# Patient Record
Sex: FEMALE | Race: BLACK OR AFRICAN AMERICAN | NOT HISPANIC OR LATINO | URBAN - METROPOLITAN AREA
[De-identification: names, ages, dates, MRNs, and addresses within clinical notes are randomized per-mention and may not be internally consistent; named-entity substitution may affect disease eponyms.]

---

## 2020-11-06 ENCOUNTER — LAB VISIT (OUTPATIENT)
Dept: LAB | Facility: OTHER | Age: 70
End: 2020-11-06
Payer: MEDICARE

## 2020-11-06 DIAGNOSIS — Z03.818 ENCOUNTER FOR OBSERVATION FOR SUSPECTED EXPOSURE TO OTHER BIOLOGICAL AGENTS RULED OUT: ICD-10-CM

## 2020-11-06 PROCEDURE — U0003 INFECTIOUS AGENT DETECTION BY NUCLEIC ACID (DNA OR RNA); SEVERE ACUTE RESPIRATORY SYNDROME CORONAVIRUS 2 (SARS-COV-2) (CORONAVIRUS DISEASE [COVID-19]), AMPLIFIED PROBE TECHNIQUE, MAKING USE OF HIGH THROUGHPUT TECHNOLOGIES AS DESCRIBED BY CMS-2020-01-R: HCPCS

## 2020-11-07 LAB — SARS-COV-2 RNA RESP QL NAA+PROBE: NOT DETECTED

## 2021-04-16 ENCOUNTER — PATIENT MESSAGE (OUTPATIENT)
Dept: RESEARCH | Facility: HOSPITAL | Age: 71
End: 2021-04-16

## 2021-05-25 ENCOUNTER — HOSPITAL ENCOUNTER (OUTPATIENT)
Dept: RADIOLOGY | Facility: OTHER | Age: 71
Discharge: HOME | End: 2021-05-25
Attending: ORTHOPAEDIC SURGERY
Payer: MEDICARE

## 2021-05-25 DIAGNOSIS — M51.36 OTHER INTERVERTEBRAL DISC DEGENERATION, LUMBAR REGION: ICD-10-CM

## 2021-05-25 DIAGNOSIS — M48.062 SPINAL STENOSIS, LUMBAR REGION WITH NEUROGENIC CLAUDICATION: ICD-10-CM

## 2021-05-25 DIAGNOSIS — M53.2X7 SPINAL INSTABILITIES OF LUMBOSACRAL REGION: ICD-10-CM

## 2021-05-25 DIAGNOSIS — M43.16 SPONDYLOLISTHESIS, LUMBAR REGION: ICD-10-CM

## 2021-05-25 PROCEDURE — 72148 MRI LUMBAR SPINE WITHOUT CONTRAST: ICD-10-PCS | Mod: 26,,, | Performed by: RADIOLOGY

## 2021-05-25 PROCEDURE — 72148 MRI LUMBAR SPINE W/O DYE: CPT | Mod: TC

## 2021-05-25 PROCEDURE — 72148 MRI LUMBAR SPINE W/O DYE: CPT | Mod: 26,,, | Performed by: RADIOLOGY

## 2023-05-22 ENCOUNTER — TELEPHONE (OUTPATIENT)
Dept: PAIN MEDICINE | Facility: CLINIC | Age: 73
End: 2023-05-22
Payer: MEDICARE

## 2023-05-23 ENCOUNTER — OFFICE VISIT (OUTPATIENT)
Dept: PAIN MEDICINE | Facility: CLINIC | Age: 73
End: 2023-05-23
Payer: MEDICARE

## 2023-05-23 VITALS
SYSTOLIC BLOOD PRESSURE: 119 MMHG | HEIGHT: 68 IN | BODY MASS INDEX: 29.73 KG/M2 | WEIGHT: 196.19 LBS | TEMPERATURE: 99 F | DIASTOLIC BLOOD PRESSURE: 67 MMHG | RESPIRATION RATE: 19 BRPM | HEART RATE: 64 BPM

## 2023-05-23 DIAGNOSIS — M51.36 DDD (DEGENERATIVE DISC DISEASE), LUMBAR: Primary | ICD-10-CM

## 2023-05-23 DIAGNOSIS — M43.06 SPONDYLOLYSIS OF LUMBAR REGION: ICD-10-CM

## 2023-05-23 DIAGNOSIS — G89.4 CHRONIC PAIN DISORDER: ICD-10-CM

## 2023-05-23 PROCEDURE — 3078F DIAST BP <80 MM HG: CPT | Mod: CPTII,S$GLB,, | Performed by: ANESTHESIOLOGY

## 2023-05-23 PROCEDURE — 3078F PR MOST RECENT DIASTOLIC BLOOD PRESSURE < 80 MM HG: ICD-10-PCS | Mod: CPTII,S$GLB,, | Performed by: ANESTHESIOLOGY

## 2023-05-23 PROCEDURE — 1125F PR PAIN SEVERITY QUANTIFIED, PAIN PRESENT: ICD-10-PCS | Mod: CPTII,S$GLB,, | Performed by: ANESTHESIOLOGY

## 2023-05-23 PROCEDURE — 1160F PR REVIEW ALL MEDS BY PRESCRIBER/CLIN PHARMACIST DOCUMENTED: ICD-10-PCS | Mod: CPTII,S$GLB,, | Performed by: ANESTHESIOLOGY

## 2023-05-23 PROCEDURE — 1160F RVW MEDS BY RX/DR IN RCRD: CPT | Mod: CPTII,S$GLB,, | Performed by: ANESTHESIOLOGY

## 2023-05-23 PROCEDURE — 3074F SYST BP LT 130 MM HG: CPT | Mod: CPTII,S$GLB,, | Performed by: ANESTHESIOLOGY

## 2023-05-23 PROCEDURE — 99204 PR OFFICE/OUTPT VISIT, NEW, LEVL IV, 45-59 MIN: ICD-10-PCS | Mod: GC,S$GLB,, | Performed by: ANESTHESIOLOGY

## 2023-05-23 PROCEDURE — 99204 OFFICE O/P NEW MOD 45 MIN: CPT | Mod: GC,S$GLB,, | Performed by: ANESTHESIOLOGY

## 2023-05-23 PROCEDURE — 3288F PR FALLS RISK ASSESSMENT DOCUMENTED: ICD-10-PCS | Mod: CPTII,S$GLB,, | Performed by: ANESTHESIOLOGY

## 2023-05-23 PROCEDURE — 1159F PR MEDICATION LIST DOCUMENTED IN MEDICAL RECORD: ICD-10-PCS | Mod: CPTII,S$GLB,, | Performed by: ANESTHESIOLOGY

## 2023-05-23 PROCEDURE — 99999 PR PBB SHADOW E&M-EST. PATIENT-LVL IV: ICD-10-PCS | Mod: PBBFAC,,, | Performed by: ANESTHESIOLOGY

## 2023-05-23 PROCEDURE — 1101F PR PT FALLS ASSESS DOC 0-1 FALLS W/OUT INJ PAST YR: ICD-10-PCS | Mod: CPTII,S$GLB,, | Performed by: ANESTHESIOLOGY

## 2023-05-23 PROCEDURE — 99999 PR PBB SHADOW E&M-EST. PATIENT-LVL IV: CPT | Mod: PBBFAC,,, | Performed by: ANESTHESIOLOGY

## 2023-05-23 PROCEDURE — 1125F AMNT PAIN NOTED PAIN PRSNT: CPT | Mod: CPTII,S$GLB,, | Performed by: ANESTHESIOLOGY

## 2023-05-23 PROCEDURE — 1159F MED LIST DOCD IN RCRD: CPT | Mod: CPTII,S$GLB,, | Performed by: ANESTHESIOLOGY

## 2023-05-23 PROCEDURE — 1101F PT FALLS ASSESS-DOCD LE1/YR: CPT | Mod: CPTII,S$GLB,, | Performed by: ANESTHESIOLOGY

## 2023-05-23 PROCEDURE — 3074F PR MOST RECENT SYSTOLIC BLOOD PRESSURE < 130 MM HG: ICD-10-PCS | Mod: CPTII,S$GLB,, | Performed by: ANESTHESIOLOGY

## 2023-05-23 PROCEDURE — 3288F FALL RISK ASSESSMENT DOCD: CPT | Mod: CPTII,S$GLB,, | Performed by: ANESTHESIOLOGY

## 2023-05-23 RX ORDER — DEXTROMETHORPHAN HYDROBROMIDE, GUAIFENESIN 5; 100 MG/5ML; MG/5ML
650 LIQUID ORAL EVERY 8 HOURS
COMMUNITY

## 2023-05-23 RX ORDER — GABAPENTIN 100 MG/1
100 CAPSULE ORAL NIGHTLY
Qty: 30 CAPSULE | Refills: 2 | Status: SHIPPED | OUTPATIENT
Start: 2023-05-23

## 2023-05-23 NOTE — PROGRESS NOTES
Chronic Pain - New Consult    Referring Physician: Sunitha Juarez    Chief Complaint:   Chief Complaint   Patient presents with    Low-back Pain        SUBJECTIVE:    Mariama Mcdowell presents to the clinic for the evaluation of lower back pain and bilateral leg pain. The pain started about 6 years ago following colorectal surgery and symptoms have been worsening.  She states she had pain years before that, however it became worse at that time.  The pain is located in the lower back area and radiates to the bilateral thigh (right worse than left).  The pain is described as sharp and is rated as 6/10. The pain is rated with a score of  0/10 on the BEST day and a score of 10/10 on the WORST day.  Symptoms interfere with daily activity. The pain is exacerbated by Standing and Walking.  The pain is mitigated by massage, medications, and physical therapy.  The patient reports 7-8 hours of uninterrupted sleep per night.    Patient denies urinary incontinence, bowel incontinence, and significant motor weakness.    Physical Therapy/Home Exercise: yes, completed last year.  Still doing stretches every morning.      Pain Disability Index Review:  Last 3 PDI Scores 5/23/2023   Pain Disability Index (PDI) 30       Pain Medications:  Tylenol every 8 hours and Aleve (only on Sunday)     report:  Reviewed and consistent with medication use as prescribed.    Pain Procedures:   None    Imaging:   MRI LUMBAR SPINE WITHOUT CONTRAST     CLINICAL HISTORY:  Other intervertebral disc degeneration, lumbar region     TECHNIQUE:  Multiplanar, multisequence MR images were acquired from the thoracolumbar junction to the sacrum without the administration of contrast.     COMPARISON:  None.     FINDINGS:  Alignment: Grade 1 anterolisthesis L4 on L5 and L5 on S1     Vertebrae: Normal marrow signal. No fracture.     Discs: Normal height and signal.     Cord: Normal.  Conus terminates at L2.     Degenerative findings:     T12-L1: Sagittal  evaluation only, unremarkable.     L1-L2: Unremarkable.     L2-L3: Circumferential disc bulge and facet hypertrophy.  Mild right neural foraminal narrowing.     L3-L4: Circumferential disc bulge and facet hypertrophy with ligamentum flavum thickening.  There is mild canal stenosis and mild right neural foraminal narrowing.     L4-L5: Uncovering of the disc with circumferential disc bulge, facet hypertrophy, and ligamentum flavum thickening.  Moderate canal stenosis, mild bilateral neural foraminal narrowing.     L5-S1: Uncovering of the disc with circumferential disc bulge and facet hypertrophy.  There is mild canal stenosis, mild bilateral neural foraminal narrowing.     Paraspinal muscles & soft tissues: Unremarkable.     Impression:     Degenerative changes as detailed above worse at inferior lumbar levels, worse at L4-5.  Grade 1 anterolisthesis L4 on L5 and L5 on S1.        Electronically signed by: Sloan Amezcua MD  Date:                                            05/25/2021  Time:                                           10:48    Past Medical History:   Diagnosis Date    Allergy     Arthritis     Cataract     Glaucoma     Hyperlipidemia      Past Surgical History:   Procedure Laterality Date    BUNIONECTOMY      left big toe    HYSTERECTOMY       Social History     Socioeconomic History    Marital status: Single   Tobacco Use    Smoking status: Never    Smokeless tobacco: Never   Substance and Sexual Activity    Alcohol use: No     Family History   Problem Relation Age of Onset    Hypertension Mother     Hyperlipidemia Mother     Cancer Father         lung       Review of patient's allergies indicates:   Allergen Reactions    Latex, natural rubber Swelling       Current Outpatient Medications   Medication Sig    acetaminophen (TYLENOL) 650 MG TbSR Take 650 mg by mouth every 8 (eight) hours.    ascorbic acid (VITAMIN C) 1000 MG tablet Take 1,000 mg by mouth once daily.    atorvastatin (LIPITOR) 20 MG  "tablet Take 20 mg by mouth once daily.    calcium carbonate (OS-ANNETTE) 600 mg (1,500 mg) Tab Take 600 mg by mouth once daily.    dorzolamide-timolol 2-0.5% (COSOPT) 2-0.5 % ophthalmic solution Place 1 drop into both eyes once daily.    fish oil-omega-3 fatty acids 300-1,000 mg capsule Take by mouth once daily.    gabapentin (NEURONTIN) 100 MG capsule Take 1 capsule (100 mg total) by mouth every evening.    latanoprost 0.005 % ophthalmic solution Place 1 drop into both eyes.    magnesium 200 mg Tab Take 400 mg by mouth once daily.    MULTIVITS W-FE,OTHER MIN/LUT (CENTRUM SILVER ULTRA WOMEN'S ORAL) Take by mouth.    vitamin D 1000 units Tab Take 185 mg by mouth once daily.     No current facility-administered medications for this visit.       REVIEW OF SYSTEMS:  GENERAL:  No weight loss, malaise or fevers.  HEENT:  Negative for frequent or significant headaches.  NECK:  Negative for lumps, goiter, pain and significant neck swelling.  RESPIRATORY:  Negative for cough, wheezing or shortness of breath.  CARDIOVASCULAR:  Negative for chest pain, leg swelling or palpitations.  GI:  Positive abdominal discomfort, -blood in stools or black stools or change in bowel habits.  MUSCULOSKELETAL:  See HPI.  SKIN:  Negative for lesions, rash, and itching.  PSYCH:  Negative for sleep disturbance, mood disorder and recent psychosocial stressors.  HEMATOLOGY/LYMPHOLOGY:  Negative for prolonged bleeding, bruising easily or swollen nodes.  NEURO:   No history of headaches, syncope, paralysis, seizures or tremors.  All other reviewed and negative other than HPI.    OBJECTIVE:    /67 (BP Location: Right arm, Patient Position: Sitting)   Pulse 64   Temp 98.7 °F (37.1 °C) (Oral)   Resp 19   Ht 5' 8" (1.727 m)   Wt 89 kg (196 lb 3.4 oz)   BMI 29.83 kg/m²     PHYSICAL EXAM:   GENERAL: Well appearing, in no acute distress, alert and appropriate appearance  PSYCH:  Mood and affect appropriate.  SKIN: Skin color, texture, turgor " normal, no rashes or lesions.  HEENT:  Normocephalic, atraumatic. Cranial nerves grossly intact.  PULM: No evidence of respiratory difficulty, symmetric chest rise.  GI:  Non-distended  BACK:  Normal range of motion. No pain to palpation over the spinous processes. No pain to palpation over facet joints. There is no pain with palpation over the sacroiliac joints bilaterally. Straight leg raising in the supine position is negative to radicular pain.   EXTREMITIES: No deformities, edema, or skin discoloration.   MUSCULOSKELETAL:  Shoulder, hip, and knee provocative maneuvers are negative. Bilateral upper and lower extremity strength is normal and symmetric. No atrophy is noted.  NEURO:  Sensation is equal and appropriate bilaterally. Bilateral upper and lower extremity coordination and muscle stretch reflexes are physiologic and symmetric. Plantar response are downgoing.   GAIT: normal.      ASSESSMENT: 73 y.o. year old female with lower back pain, consistent with:     1. DDD (degenerative disc disease), lumbar  gabapentin (NEURONTIN) 100 MG capsule    Ambulatory referral/consult to [facility]      2. Spondylolysis of lumbar region  Procedure Order to Pain Management    gabapentin (NEURONTIN) 100 MG capsule    Ambulatory referral/consult to [facility]      3. Chronic pain disorder          PLAN:   - I have stressed the importance of physical activity and a home exercise plan to help with pain and improve health.  - Patient can continue with medications for now since they are providing benefits, using them appropriately, and without side effects.  - schedule for bilateral L3, L4, L5 medial branch blocks with plan for radiofrequency ablation as indicated  - start gabapentin 100mg at night  - referral to healthy [facility] program  - RTC after medial branch blocks vs. ablation  - Counseled patient regarding the importance of activity modification and physical therapy.    The above plan and management  options were discussed at length with patient. Patient is in agreement with the above and verbalized understanding. It will be communicated with the referring physician via electronic record, fax, or mail.    Juan Mcdermott  05/23/2023      I have reviewed and concur with the resident's history, physical, assessment, and plan.  I have personally interviewed and examined the patient at bedside.  See below addendum for my evaluation and additional findings.    Randy Anglin MD

## 2023-05-24 ENCOUNTER — TELEPHONE (OUTPATIENT)
Dept: ADMINISTRATIVE | Facility: OTHER | Age: 73
End: 2023-05-24
Payer: MEDICARE

## 2023-06-28 ENCOUNTER — CLINICAL SUPPORT (OUTPATIENT)
Dept: REHABILITATION | Facility: OTHER | Age: 73
End: 2023-06-28
Payer: MEDICARE

## 2023-06-28 DIAGNOSIS — M43.06 SPONDYLOLYSIS OF LUMBAR REGION: ICD-10-CM

## 2023-06-28 DIAGNOSIS — R68.89 DECREASED ACTIVITY TOLERANCE: ICD-10-CM

## 2023-06-28 DIAGNOSIS — M51.36 DDD (DEGENERATIVE DISC DISEASE), LUMBAR: ICD-10-CM

## 2023-06-28 DIAGNOSIS — R29.898 DECREASED STRENGTH OF TRUNK AND BACK: ICD-10-CM

## 2023-06-28 PROCEDURE — 97530 THERAPEUTIC ACTIVITIES: CPT

## 2023-06-28 PROCEDURE — 97161 PT EVAL LOW COMPLEX 20 MIN: CPT

## 2023-06-28 PROCEDURE — 97112 NEUROMUSCULAR REEDUCATION: CPT

## 2023-06-28 NOTE — PLAN OF CARE
[facility]  Physical Therapy Lumbar Evaluation      Name: Mariama Mcdowell  Clinic Number: [id number]    Therapy Diagnosis:   Encounter Diagnoses   Name Primary?    DDD (degenerative disc disease), lumbar     Spondylolysis of lumbar region     Decreased activity tolerance     Decreased strength of trunk and back      Physician: Juan Mcdermott MD    Physician Orders: PT Eval and Treat  Medical Diagnosis from Referral:   1. DDD (degenerative disc disease), lumbar    2. Spondylolysis of lumbar region    3. Decreased activity tolerance    4. Decreased strength of trunk and back      Evaluation Date: 6/28/2023  Authorization Period Expiration: 12/31/2023  Plan of Care Expiration: 9/28/2023  Reassessment Due: 7/28/2023  Visit # / Visits authorized: 1/1    Time In: 9:50  Time Out: 11:10  Total Billable Time: 80 minutes  INSURANCE and OUTCOMES: Value Based Insurance with FOTO Outcomes 1/3    Precautions: standard    Pattern of pain determined: 4 PEP    Subjective     Date of onset/History of current condition: Mariama reports The pain at this time is in the the buttocks down to the thighs and the center of the back, she feels the sciatic pain is worse than the back pain. Difficulty with prolonged walking an standing. With a grocery cart she can walk around as long as she needs to, and can standing with support. The main pressure it at the buttocks when sitting    Per MD  Mariama Mcdowell presents to the clinic for the evaluation of lower back pain and bilateral leg pain. The pain started about 6 years ago following colorectal surgery and symptoms have been worsening.  She states she had pain years before that, however it became worse at that time.  The pain is located in the lower back area and radiates to the bilateral thigh (right worse than left).  The pain is described as sharp and is rated as 6/10. The pain is rated with a score of  0/10 on the BEST day and a score of 10/10 on the WORST  day.  Symptoms interfere with daily activity. The pain is exacerbated by Standing and Walking.  The pain is mitigated by massage, medications, and physical therapy.  The patient reports 7-8 hours of uninterrupted sleep per night.     Patient denies urinary incontinence, bowel incontinence, and significant motor weakness.     Physical Therapy/Home Exercise: yes, completed last year.  Still doing stretches every morning.       Medical History:   Past Medical History:   Diagnosis Date    Allergy     Arthritis     Cataract     Glaucoma     Hyperlipidemia        Surgical History:   Mariama Mcdowell  has a past surgical history that includes Hysterectomy and Bunionectomy.    Medications:   Mariama has a current medication list which includes the following prescription(s): acetaminophen, ascorbic acid (vitamin c), atorvastatin, calcium carbonate, dorzolamide-timolol 2-0.5%, fish oil-omega-3 fatty acids, gabapentin, latanoprost, magnesium, multivit,iron,minerals/lutein, and vitamin d.    Allergies:   Review of patient's allergies indicates:   Allergen Reactions    Latex, natural rubber Swelling        Imaging: MRI 5/2001  EXAMINATION:  MRI LUMBAR SPINE WITHOUT CONTRAST     CLINICAL HISTORY:  Other intervertebral disc degeneration, lumbar region     TECHNIQUE:  Multiplanar, multisequence MR images were acquired from the thoracolumbar junction to the sacrum without the administration of contrast.     COMPARISON:  None.     FINDINGS:  Alignment: Grade 1 anterolisthesis L4 on L5 and L5 on S1     Vertebrae: Normal marrow signal. No fracture.     Discs: Normal height and signal.     Cord: Normal.  Conus terminates at L2.     Degenerative findings:     T12-L1: Sagittal evaluation only, unremarkable.     L1-L2: Unremarkable.     L2-L3: Circumferential disc bulge and facet hypertrophy.  Mild right neural foraminal narrowing.     L3-L4: Circumferential disc bulge and facet hypertrophy with ligamentum flavum thickening.  There is mild canal  stenosis and mild right neural foraminal narrowing.     L4-L5: Uncovering of the disc with circumferential disc bulge, facet hypertrophy, and ligamentum flavum thickening.  Moderate canal stenosis, mild bilateral neural foraminal narrowing.     L5-S1: Uncovering of the disc with circumferential disc bulge and facet hypertrophy.  There is mild canal stenosis, mild bilateral neural foraminal narrowing.     Paraspinal muscles & soft tissues: Unremarkable.     Impression:     Degenerative changes as detailed above worse at inferior lumbar levels, worse at L4-5.  Grade 1 anterolisthesis L4 on L5 and L5 on S1.    Prior Therapy: stretches, massage, cupping, acupuncture, dry needling, only helped temporarily   Prior Treatment: None, her bother had the same issue and got injections until he was numb from the swaits down and  from sepsis.   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  Where is your pain the worst? legs  2.  Is your pain constant or intermittent? constant  3.  Does bending forward make your typical pain worse? yes  4.  Since the start of your back pain, has there been a change in your bowel or bladder? no  5.  What can't you do now that you use to be able to do? Prolonged unsupported standing or  "walking    Pts goals: I want the sciatic to stop    Red Flag Screening:   Cough/Sneeze Strain: (+)  Bladder/Bowel: (--)  Falls: (--)  Night pain: (--)  Unexplained weight loss: (--)  General health: fair    Objective      Postural examination/scapula alignment:  Shoulder level, forward head, rounded shoulders, hips/feet ER  Joint integrity: Hard end feel  Skin integrity:WNL   Edema: None  Correction of posture: better with lumbar roll  Sitting: fair  Standing: fair    GAIT:  Assistive Device used: none  Level of Assistance: independent  Patient displays the following gait deviations: no gait deviations observed.    MOVEMENT LOSS - Lumbar   Norms ROM Loss Initial   Flexion Fingers touch toes, sacral angle >/= 70 deg, uniform spinal curvature, posterior weight shift  within functional limits P! With movement   Extension ASIS surpasses toes, spine of scapulae surpasses heels, uniform spinal curve moderate loss   Side glide Right  moderate loss P! On R   Side glide Left  minimal loss "better on that side"   Rotation Right PT observes contralateral shoulder moderate loss P!   Rotation Left PT observes contralateral shoulder minimal loss     Lower Extremity Strength  Right LE  Left LE    Hip flexion: 4/5 Hip flexion: 4/5   Hip abduction: 4+/5 Hip abduction: 4+/5   Hip adduction:  4+/5 Hip adduction:  4+/5   Hip External Rotation 4+/5 Hip External Rotation 4+/5   Hip Internal rotation   4+/5 Hip Internal rotation 4+/5   Knee Flexion 5/5 Knee Flexion 5/5   Knee Extension 5/5 Knee Extension 5/5   Ankle dorsiflexion: 5/5 Ankle dorsiflexion: 5/5   Ankle plantarflexion: 5/5 Ankle plantarflexion: 5/5       Special Tests:   Test Name  Test Result   Prone Instability Test NT   SI Joint Provocation Test (--)   Thigh thrust (--)   Sacral Thrust NT   Straight Leg Raise (+) bilat   Neural Tension Test (--)   Crossed Straight Leg Raise (--)   RAYSHAWN (--)   Leg length Discrepancy  NT     NEUROLOGICAL SCREENING:     Sensory deficits: " Intact to light touch      REPEATED TEST MOVEMENTS:    Baseline symptoms:  Repeated Flexion in Standing NT   Repeated Extension in Standing no effect   Repeated Flexion in lying better   Repeated Extension in lying  no effect     ! bridge    STATIC TESTS and other movements:   Prone lie better  abolished   Prone lie on elbows better   Sitting slouched  better   Sitting erect worse       Baseline Isometric Testing on Med X equipment: Testing administered by PT    Date of testin2023  ROM 0-42 deg   Max Peak Torque 91    Min Peak Torque 20    Flex/Ext Ratio 4.55:1   % below normative data 48     Start at 35 ft lbs    Intake/Outcome for FOTO Lumbar Surveys    Therapist reviewed FOTO scores for Mariama Mcdowell on 2023  FOTO documents entered into [employer] - see Media section.    Intake Score: 32%   Visit 5 Score:   Visit 10 Score:   Discharge Score:   Goal Score: 15%       Treatment     Treatment Time In: 10:40  Treatment Time Out: 11:10  Total Treatment time separate from Evaluation: 30 minutes    Mariama received neuromuscular education to engage spinal musculature correctly for motor control and engagement of musculature for 15 minutes including the MedX exercise component and practice and standard testing. MedX dynamic exercise and baseline isometric test performed with instructions to guide the patient safely through the testing procedure. Patient instructed to perform isometric test correctly and safely while building to an optimal force with a pain-free effort. Patient also instructed that she should feel support/pressure from MedX restraints but no pain/discomfort. Patient demonstrated appropriate understanding of information.     [facility] 2023   Visit Number 1   VAS Pain Rating 7   Lumbar Stretches - Slouch Overcorrection 10   Extension in Lying 10   Extension in Standing 10   Flexion in Lying 10   Lumbar Extension Seat Pad 1   Femur Restraint 6   Top Dead Center 24   Counterweight 172    Lumbar Flexion 42   Lumbar Extension 0   Lumbar Peak Torque 91   Min Torque 20   Test Percent Below Normative Data 48   Ice - Z Lie (in min.) 10        Therapeutic Education/Activity provided for 10 minutes:   - Patient was given an [facility] Visit 1 handout which discusses the following:  - what to expect in therapy  - an overview of the program, including health coaching and wellness  - importance of spinal hygiene, proper posture, lifting mechanics, sleep quality, and nutrition/hydration   - Lloyd roll trialed, recommended, and purchase information was provided.  - Patient received a handout regarding anticipated muscular soreness following the isometric test and strategies for management were reviewed with patient including stretching, using ice and scheduled rest.   - Patient received verbal education on the following:   - [facility] program   - purpose of the isometric test  - safe progression of lumbar strengthening, wellness approach, and systemic strengthening.   - safe usage of MedX machine and testing protocols.    Mariama received cold pack for 5 minutes to low back in Z-lie.    Written Home Exercises Provided: yes.    HEP AS FOLLOWS:  Prone lying  Prone on elbows  Extension in standing    Exercises were reviewed and Mariama was able to demonstrate them prior to the end of the session.  Mariama demonstrated good  understanding of the education provided.     See EMR under Patient Instructions for exercises provided 6/28/2023.    Assessment     Mariama is a 73 y.o. female referred to [facility] with a medical diagnosis of M43.06 (ICD-10-CM) - Spondylolysis of lumbar region , M51.36 (ICD-10-CM) - DDD (degenerative disc disease), lumbar . Pt presents with complaints of bilateral posterior glut/thigh pain and back pain that is worse with prolonged standing, walking, bending, and lifting. Her primary complaint is pain in the legs. She has fair mobility with some pain in flexion, R side glide  and R rotation. She is able to complete flexion exercises without increase in sx and states that she does the stretches daily to loosen up. In prone lying position, lying, elbows and full press up she was able to abolish the pain in the legs to 0/10 with an increase in pressure/discomfort in the back. On the lumbar medx her strength was 48% below the averages.     Pain Pattern: 4 PEP       Pt prognosis is Excellent.     Pt will benefit from skilled outpatient Physical Therapy to address the deficits stated above and in the chart below, to provide pt/family education, and to maximize pt's level of independence. Based on the above history and physical examination an active physical therapy program is recommended.      Plan of care discussed with patient: Yes  Pt's spiritual, cultural and educational needs considered and patient is agreeable to the plan of care and goals as stated below:     Anticipated Barriers for therapy: vacation    PT Evaluation Completed? Yes    Medical necessity is demonstrated by the following problem list:    History  Co-morbidities and personal factors that may impact the plan of care [x] LOW: no personal factors / co-morbidities  [] MODERATE: 1-2 personal factors / co-morbidities  [] HIGH: 3+ personal factors / co-morbidities    Moderate / High Support Documentation:   Co-morbidities affecting plan of care:     Personal Factors:   lifestyle     Examination  Body Structures and Functions, activity limitations and participation restrictions that may impact the plan of care [x] LOW: addressing 1-2 elements  [] MODERATE: 3+ elements  [] HIGH: 4+ elements (please support below)    Moderate / High Support Documentation:     Clinical Presentation [x] LOW: stable  [] MODERATE: Evolving  [] HIGH: Unstable     Decision Making/ Complexity Score: low         GOALS: Pt is in agreement with the following goals.    Short term goals:  6 weeks or 10 visits   - Pt will demonstrate increased lumbar ROM by at  least 3 degrees from the initial ROM value with improvements noted in functional ROM and ability to perform ADLs. Appropriate and Ongoing  - Pt will demonstrate increased MedX average isometric strength value by 15% from initial test resulting in improved ability to perform bending, lifting, and carrying activities safely, confidently. Appropriate and Ongoing  - Pt will report a reduction in worst pain score by 1-2 points for improved tolerance for static standing unsupported. Appropriate and Ongoing  - Pt able to perform HEP correctly with minimal cueing or supervision from therapist to encourage independent management of symptoms. Appropriate and Ongoing    Long term goals: 10 weeks or 20 visits   - Pt will demonstrate increased lumbar ROM by at least 6 degrees from initial ROM value, resulting in improved ability to perform functional forward bending while standing and sitting. Appropriate and Ongoing  - Pt will demonstrate increased MedX average isometric strength value by 25% from initial test resulting in improved ability to perform bending, lifting, and carrying activities safely and confidently. Appropriate and Ongoing  - Pt to demonstrate ability to independently control and reduce their pain through posture positioning and mechanical movements throughout a typical day. Appropriate and Ongoing  - Pt will demonstrate reduced pain and improved functional outcomes as reported on the FOTO by reaching a limitation score of < or = 15% or less in order to demonstrate subjective improvement in pt's condition.   Appropriate and Ongoing  - Pt will demonstrate independence with the HEP at discharge. Appropriate and Ongoing  - Pt able to walk in the grocery store with out needing to lean on the basket(patient goal) Appropriate and Ongoing    Plan     Outpatient physical therapy 2x week for 10 weeks or 20 visits to include the following:   - Patient education  - Therapeutic exercise  - Manual therapy  - Performance  testing   - Neuromuscular Re-education  - Therapeutic activity   - Modalities    Pt may be seen by PTA as part of the rehabilitation team.     Therapist: Annmarie Morgan, PT  6/28/2023

## 2023-06-29 ENCOUNTER — TELEPHONE (OUTPATIENT)
Dept: REHABILITATION | Facility: OTHER | Age: 73
End: 2023-06-29
Payer: MEDICARE

## 2023-09-06 ENCOUNTER — CLINICAL SUPPORT (OUTPATIENT)
Dept: REHABILITATION | Facility: OTHER | Age: 73
End: 2023-09-06
Payer: MEDICARE

## 2023-09-06 DIAGNOSIS — R68.89 DECREASED ACTIVITY TOLERANCE: Primary | ICD-10-CM

## 2023-09-06 DIAGNOSIS — R29.898 DECREASED STRENGTH OF TRUNK AND BACK: ICD-10-CM

## 2023-09-06 PROCEDURE — 97112 NEUROMUSCULAR REEDUCATION: CPT

## 2023-09-06 PROCEDURE — 97110 THERAPEUTIC EXERCISES: CPT

## 2023-09-06 NOTE — PROGRESS NOTES
[facility]  Physical Therapy Treatment Note     Name: Mariama Mcdowell  Clinic Number: [id number]    Therapy Diagnosis:   Encounter Diagnoses   Name Primary?    Decreased activity tolerance Yes    Decreased strength of trunk and back      Physician: Juan Mcdermott MD    Visit Date: 2023    Physician Orders: PT Eval and Treat  Medical Diagnosis from Referral:   1. DDD (degenerative disc disease), lumbar    2. Spondylolysis of lumbar region    3. Decreased activity tolerance    4. Decreased strength of trunk and back       Evaluation Date: 2023  Authorization Period Expiration: 2023  Plan of Care Expiration: 2023  Reassessment Due: 2023  Visit # / Visits authorized:      Time In: 11:30  Time Out: 12:20  Total Billable Time: 50 minutes  INSURANCE and OUTCOMES: Value Based Insurance with FOTO Outcomes 1/3     Precautions: standard     Pattern of pain determined: 4 PEP    Subjective     Mariama Mcdowell reports the sciatic pain is painful.     Patient reports tolerating previous visit fair  Patient reports their pain to be 8/10 on a 0-10 scale with 0 being no pain and 10 being the worst pain imaginable.  Pain Location: Low back and bilateral legs to the thighs     Occupation: no  Leisure: [employer], activity in the building she lives in (arts and crafts, [employer], parties)  Pts goals: I want the sciatic to stop    Objective         Baseline Isometric Testing on Med X equipment: Testing administered by PT     Date of testin2023  ROM 0-42 deg   Max Peak Torque 91    Min Peak Torque 20    Flex/Ext Ratio 4.55:1   % below normative data 48      Start at 35 ft lbs    Intake/Outcome for FOTO Lumbar Surveys     Therapist reviewed FOTO scores for Mariama Mcdowell on 2023  FOTO documents entered into [employer] - see Media section.     Intake Score: 32%   Visit 5 Score:   Visit 10 Score:   Discharge Score:   Goal Score: 15%           Treatment     Mariama received the  treatments listed below:      Mariama received neuromuscular education for 10 minutes via participation on the [employer] Machine. Therapist assisted patient in isolating and engaging spinal stabilization musculature in order to improve functional ability and postural control. Patient performed exercise with therapist guidance in order to accurately use pacer function, avoid valsalva, and optimally exert effort within a safe and effective range via the Daquan Exertion Rating Scale. Patient instructed to perform at a midrange of exertion and to complete 15-20 repetitions within appropriate split time, with proper technique, and while maintaining safety.        9/6/2023    11:55 AM   [facility]   Visit Number 2   VAS Pain Rating 8   Extension in Lying 15   Extension in Standing 20   Lumbar Weight 35 lbs   Repetitions 15   Rating of Perceived Exertion 3     -Cues to smooth pace instead of starting and stopping in order to improve motor control  -Cues to concentrate on extension hold to promote upright standing endurance and posture  -Cues to Focus on pushing with the back instead of the legs    Mariama participated in therapeutic exercises to develop strength, endurance, ROM, flexibility, and core stabilization for 40 minutes including:    Prone lying 2 min (get the pain down to a 1)  Prone on elbows 2 min  Extension in Lying x10  Extension in Lying x5 with sag  Extension in standing 2x10    Peripheral muscle strengthening which included one set of 15-20 repetitions at a slow and controlled 10-13 second per rep pace focused on strengthening supporting musculature in order to improve body mechanics and functional mobility. Patient and therapist focused on proper form during treatment to ensure optimal strengthening of each targeted muscle group.  Machines utilized include torso rotation, leg extension, leg curl, chest press, upright row. Tricep extension, bicep curl, leg press, and hip abduction added visit 3    Pt  "given cold pack for 5  minutes to low back  in supine.    Patient Education and Home Exercises     Home exercises include:  Prone on elbows   Extension in Lying   Extension in standing   Cardio program (V5): -  Lifting education (V11): -  Posture/Lumbar roll: TBD  Fridge Magnet Discharge handout (date given): -  DME owned/used: [employer] (stretcher)  Gym Membership: In her building, stationary bike and treadmill, will use every down and then sometime comes to do chair exercises      Education provided:   - Patient received education in benefits of progressing mobility and strengthening gradually  - Discussed exertion scale, slow progressive resistance protocol to promote safe and healthy strengthening of supportive musculature  by performing 15-20 reps, 7 sec per rep, and increasing weight by 5 % at 20 reps only if there ex done safely, slowly, using correct musculature, exertion and no pain.  Patient expressed understanding.  -Pt educated on strategies to safely perform examination and exercise using "Stop Light" analogy to describe how to avoid or stop irritating motions, proceed with caution with some movements, and progress positive exercises.     Written Home Exercises Provided: Patient instructed to cont prior HEP.  Exercises were reviewed and Mariama was able to demonstrate them prior to the end of the session.  Mariama demonstrated good  understanding of the education provided.     See EMR under Patient Instructions for exercises provided 9/6/2023.    Assessment     Pt presents to second [facility] back visit reporting increased low back and glut pain that got much better post therapy. She had a decrease in pain from 8/10 to 1/10 with prone lying and continued to educate pt on benefits of extension based exercises with decreasing her sx. Pt was able to start strengthening and endurance training on the lumbar MedX at 50% of max peak torque according to the initial visit isometric test and flexion extension strength " ratio. Pt was able to complete 15 reps, with 3/10 RPE. Pt was also able to complete half of the peripheral strengthening exercises without increased discomfort and will complete the complete circuit next visit as tolerated.    Patient is making good progress towards established goals.  Pt will continue to benefit from skilled outpatient physical therapy to address the deficits stated in the impairment chart, provide pt/family education and to maximize pt's level of independence in the home and community environment.     Anticipated Barriers for therapy: None  Pt's spiritual, cultural and educational needs considered and pt agreeable to plan of care and goals as stated below:     GOALS: Pt is in agreement with the following goals.     Short term goals:  6 weeks or 10 visits   - Pt will demonstrate increased lumbar ROM by at least 3 degrees from the initial ROM value with improvements noted in functional ROM and ability to perform ADLs. Appropriate and Ongoing  - Pt will demonstrate increased MedX average isometric strength value by 15% from initial test resulting in improved ability to perform bending, lifting, and carrying activities safely, confidently. Appropriate and Ongoing  - Pt will report a reduction in worst pain score by 1-2 points for improved tolerance for static standing unsupported. Appropriate and Ongoing  - Pt able to perform HEP correctly with minimal cueing or supervision from therapist to encourage independent management of symptoms. Appropriate and Ongoing     Long term goals: 10 weeks or 20 visits   - Pt will demonstrate increased lumbar ROM by at least 6 degrees from initial ROM value, resulting in improved ability to perform functional forward bending while standing and sitting. Appropriate and Ongoing  - Pt will demonstrate increased MedX average isometric strength value by 25% from initial test resulting in improved ability to perform bending, lifting, and carrying activities safely and  confidently. Appropriate and Ongoing  - Pt to demonstrate ability to independently control and reduce their pain through posture positioning and mechanical movements throughout a typical day. Appropriate and Ongoing  - Pt will demonstrate reduced pain and improved functional outcomes as reported on the FOTO by reaching a limitation score of < or = 15% or less in order to demonstrate subjective improvement in pt's condition.   Appropriate and Ongoing  - Pt will demonstrate independence with the HEP at discharge. Appropriate and Ongoing  - Pt able to walk in the grocery store with out needing to lean on the basket(patient goal) Appropriate and Ongoing    Plan     Continue with established Plan of Care towards established PT goals.     Therapist: Annmarie Morgan, PT  9/6/2023

## 2023-09-08 ENCOUNTER — CLINICAL SUPPORT (OUTPATIENT)
Dept: REHABILITATION | Facility: OTHER | Age: 73
End: 2023-09-08
Payer: MEDICARE

## 2023-09-08 DIAGNOSIS — R29.898 DECREASED STRENGTH OF TRUNK AND BACK: ICD-10-CM

## 2023-09-08 DIAGNOSIS — R68.89 DECREASED ACTIVITY TOLERANCE: Primary | ICD-10-CM

## 2023-09-08 PROCEDURE — 97112 NEUROMUSCULAR REEDUCATION: CPT | Mod: CQ

## 2023-09-08 PROCEDURE — 97110 THERAPEUTIC EXERCISES: CPT | Mod: CQ

## 2023-09-08 NOTE — PROGRESS NOTES
"[facility]  Physical Therapy Treatment Note     Name: Mariama Mcdowell  Clinic Number: [id number]    Therapy Diagnosis:   Encounter Diagnoses   Name Primary?    Decreased activity tolerance Yes    Decreased strength of trunk and back        Physician: Juan Mcdermott MD    Visit Date: 2023    Physician Orders: PT Eval and Treat  Medical Diagnosis from Referral:   1. DDD (degenerative disc disease), lumbar    2. Spondylolysis of lumbar region    3. Decreased activity tolerance    4. Decreased strength of trunk and back       Evaluation Date: 2023  Authorization Period Expiration: 2023  Plan of Care Expiration: 2023  Reassessment Due: 2023  Visit # / Visits authorized:      Time In: 10:30  Time Out: 11:30  Total Billable Time: 60 minutes  INSURANCE and OUTCOMES: Value Based Insurance with FOTO Outcomes 1/3     Precautions: standard     Pattern of pain determined: 4 PEP    Subjective     Mariama Mcdowell reports the sciatic pain is bother her this morning. Pt states no difficulty with HEP, performing daily, "they feel good".    Patient reports tolerating previous visit fair  Patient reports their pain to be 8/10 on a 0-10 scale with 0 being no pain and 10 being the worst pain imaginable.  Pain Location: Low back and bilateral legs to the thighs     Occupation: no  Leisure: Christian, activity in the building she lives in (arts and [employer], [employer], parties)  Pts goals: I want the sciatic to stop    Objective         Baseline Isometric Testing on Med X equipment: Testing administered by PT     Date of testin2023  ROM 0-42 deg   Max Peak Torque 91    Min Peak Torque 20    Flex/Ext Ratio 4.55:1   % below normative data 48      Start at 35 ft lbs    Intake/Outcome for FOTO Lumbar Surveys     Therapist reviewed FOTO scores for Mariama Mcdowell on 2023  FOTO documents entered into [employer] - see Media section.     Intake Score: 32%   Visit 5 Score:   Visit 10 Score: "   Discharge Score:   Goal Score: 15%           Treatment     Mariama received the treatments listed below:      Mariama received neuromuscular education for 10 minutes via participation on the [email or web address] Machine. Therapist assisted patient in isolating and engaging spinal stabilization musculature in order to improve functional ability and postural control. Patient performed exercise with therapist guidance in order to accurately use pacer function, avoid valsalva, and optimally exert effort within a safe and effective range via the Daquan Exertion Rating Scale. Patient instructed to perform at a midrange of exertion and to complete 15-20 repetitions within appropriate split time, with proper technique, and while maintaining safety.         9/8/2023    11:02 AM   [facility]   Visit Number 3   VAS Pain Rating 8   Extension in Lying 20   Extension in Standing 10   Lumbar Weight 35 lbs   Repetitions 20   Rating of Perceived Exertion 4         -Cues to smooth pace instead of starting and stopping in order to improve motor control  -Cues to concentrate on extension hold to promote upright standing endurance and posture  -Cues to Focus on pushing with the back instead of the legs    Mariama participated in therapeutic exercises to develop strength, endurance, ROM, flexibility, and core stabilization for 50 minutes including:    +LTR x10  +PPT x10  +Bridge x10, 3' (limited lift)  +Supine HS stretch   Prone lying 2 min (get the pain down to a 1)  Prone on elbows 2 min  Extension in Lying x10  Extension in Lying x5 with sag (exhale)  Extension in standing 2x10    Peripheral muscle strengthening which included one set of 15-20 repetitions at a slow and controlled 10-13 second per rep pace focused on strengthening supporting musculature in order to improve body mechanics and functional mobility. Patient and therapist focused on proper form during treatment to ensure optimal strengthening of each targeted muscle group.   "Machines utilized include torso rotation, leg extension, leg curl, chest press, upright row. Tricep extension, bicep curl, leg press, and hip abduction added visit 3    Pt given cold pack for 5  minutes to low back  in supine.    Patient Education and Home Exercises     Home exercises include:  Prone on elbows   Extension in Lying   Extension in standing   Cardio program (V5): -  Lifting education (V11): -  Posture/Lumbar roll: TBD  Fridge Magnet Discharge handout (date given): -  DME owned/used: [employer] (stretcher)  Gym Membership: In her building, stationary bike and treadmill, will use every now and then sometime goes to do chair exercises      Education provided:   - Patient received education in benefits of progressing mobility and strengthening gradually  - Discussed exertion scale, slow progressive resistance protocol to promote safe and healthy strengthening of supportive musculature  by performing 15-20 reps, 7 sec per rep, and increasing weight by 5 % at 20 reps only if there ex done safely, slowly, using correct musculature, exertion and no pain.  Patient expressed understanding.  -Pt educated on strategies to safely perform examination and exercise using "Stop Light" analogy to describe how to avoid or stop irritating motions, proceed with caution with some movements, and progress positive exercises.     Written Home Exercises Provided: Patient instructed to cont prior HEP.  Exercises were reviewed and Mariama was able to demonstrate them prior to the end of the session.  Mariama demonstrated good  understanding of the education provided.     See EMR under Patient Instructions for exercises provided 9/6/2023.    Assessment     Pt presents to third HB visit reporting increased low back and glut pain which decreased post therapy. She had a decrease in pain from 8/10 to 1/10 with prone lying and continued to educate pt on benefits of extension based exercises with decreasing her sx. Added initial core " strengthening exercises which pt tolerated fairly well with limits on her bridge. Pt was able to cont neuro-reeducation, strengthening and endurance training on the lumbar MedX at 35ft/lbs, completing 20 reps, with 3/10 RPE. Pt was also able to complete all peripheral strengthening exercises without increased discomfort. Progress per HB protocol and pt's tolerance.     Patient is making good progress towards established goals.  Pt will continue to benefit from skilled outpatient physical therapy to address the deficits stated in the impairment chart, provide pt/family education and to maximize pt's level of independence in the home and community environment.     Anticipated Barriers for therapy: None  Pt's spiritual, cultural and educational needs considered and pt agreeable to plan of care and goals as stated below:     GOALS: Pt is in agreement with the following goals.     Short term goals:  6 weeks or 10 visits   - Pt will demonstrate increased lumbar ROM by at least 3 degrees from the initial ROM value with improvements noted in functional ROM and ability to perform ADLs. Appropriate and Ongoing  - Pt will demonstrate increased MedX average isometric strength value by 15% from initial test resulting in improved ability to perform bending, lifting, and carrying activities safely, confidently. Appropriate and Ongoing  - Pt will report a reduction in worst pain score by 1-2 points for improved tolerance for static standing unsupported. Appropriate and Ongoing  - Pt able to perform HEP correctly with minimal cueing or supervision from therapist to encourage independent management of symptoms. Appropriate and Ongoing     Long term goals: 10 weeks or 20 visits   - Pt will demonstrate increased lumbar ROM by at least 6 degrees from initial ROM value, resulting in improved ability to perform functional forward bending while standing and sitting. Appropriate and Ongoing  - Pt will demonstrate increased MedX average  isometric strength value by 25% from initial test resulting in improved ability to perform bending, lifting, and carrying activities safely and confidently. Appropriate and Ongoing  - Pt to demonstrate ability to independently control and reduce their pain through posture positioning and mechanical movements throughout a typical day. Appropriate and Ongoing  - Pt will demonstrate reduced pain and improved functional outcomes as reported on the FOTO by reaching a limitation score of < or = 15% or less in order to demonstrate subjective improvement in pt's condition.   Appropriate and Ongoing  - Pt will demonstrate independence with the HEP at discharge. Appropriate and Ongoing  - Pt able to walk in the grocery store with out needing to lean on the basket(patient goal) Appropriate and Ongoing    Plan     Continue with established Plan of Care towards established PT goals.     Therapist: Bing Jon, PTA  9/8/2023

## 2023-09-11 ENCOUNTER — TELEPHONE (OUTPATIENT)
Dept: OTOLARYNGOLOGY | Facility: CLINIC | Age: 73
End: 2023-09-11
Payer: MEDICARE

## 2023-09-11 NOTE — TELEPHONE ENCOUNTER
----- Message from Cassi Salcedo sent at 9/8/2023 12:36 PM CDT -----      Name of Who is Calling: JAGDISH ESPINOZA [[id number]]      What is the request in detail: Pt called to schedule an appt for her throat.Please contact to further discuss and advise.          Can the clinic reply by [facility]: Y      [facility] [phone]

## 2023-09-11 NOTE — TELEPHONE ENCOUNTER
Spoke with patient told her she would have to go to a doctor at [facility] that I did not have anyone available at [facility] / Dr. Wells is no longer seeing patients for the foreseeable future.

## 2023-10-03 ENCOUNTER — CLINICAL SUPPORT (OUTPATIENT)
Dept: REHABILITATION | Facility: OTHER | Age: 73
End: 2023-10-03
Payer: MEDICARE

## 2023-10-03 DIAGNOSIS — R68.89 DECREASED ACTIVITY TOLERANCE: Primary | ICD-10-CM

## 2023-10-03 DIAGNOSIS — R29.898 DECREASED STRENGTH OF TRUNK AND BACK: ICD-10-CM

## 2023-10-03 PROCEDURE — 97112 NEUROMUSCULAR REEDUCATION: CPT

## 2023-10-03 PROCEDURE — 97110 THERAPEUTIC EXERCISES: CPT

## 2023-10-03 NOTE — PROGRESS NOTES
[facility]  Physical Therapy Treatment Note     Name: Mariama Mcdowell  Clinic Number: [id number]    Therapy Diagnosis:   Encounter Diagnoses   Name Primary?    Decreased activity tolerance Yes    Decreased strength of trunk and back      Physician: Juan Mcdermott MD    Visit Date: 10/3/2023    Physician Orders: PT Eval and Treat  Medical Diagnosis from Referral:   1. DDD (degenerative disc disease), lumbar    2. Spondylolysis of lumbar region    3. Decreased activity tolerance    4. Decreased strength of trunk and back       Evaluation Date: 6/28/2023  Authorization Period Expiration: 12/31/2023  Plan of Care Expiration: 9/28/2023  Reassessment Due: 7/28/2023  Visit # / Visits authorized: 2/20     Time In: 10:15  Time Out: 11:15   Total Billable Time: 45 minutes 1:1   Total Treatment Time: 60 min   INSURANCE and OUTCOMES: Value Based Insurance with FOTO Outcomes 1/3     Precautions: standard     Pattern of pain determined: 4 PEP    Subjective     Mariama Mcdowell reports missing extended time since last visit due to schedule lapse.    Patient report her muscles feel relaxed during her HEP and report daily compliance.  Patient report using pillow at her LB when sitting in her recliner to attend to posture.  Patient report continue difficulty /c household tasks naming extended standing for cooking as an example.  Patient arrive today noting inc lumbar and post thigh discomfort.       Patient reports tolerating previous visit well /c no c/o of excess discomfort.   Patient reports their pain to be 8/10 on a 0-10 scale with 0 being no pain and 10 being the worst pain imaginable.  Pain Location: Low back and bilateral legs to the thighs     Occupation: no  Leisure: Taoism, activity in the building she lives in (arts and [employer], [employer], [employer])  Pts goals: I want the sciatic to stop    Objective         Baseline Isometric Testing on Med X equipment: Testing administered by PT     Date of  testin2023  ROM 0-42 deg   Max Peak Torque 91    Min Peak Torque 20    Flex/Ext Ratio 4.55:1   % below normative data 48   Start at 35 ft lbs    Intake/Outcome for FOTO Lumbar Surveys     Therapist reviewed FOTO scores for Mariama Mcdowell on 2023  FOTO documents entered into [employer] - see Media section.     Intake Score: 32%   Visit 5 Score:   Visit 10 Score:   Discharge Score:   Goal Score: 15%           Treatment     Mariama received the treatments listed below:      Mariama received neuromuscular education for 10 minutes via participation on the [employer] Machine. Therapist assisted patient in isolating and engaging spinal stabilization musculature in order to improve functional ability and postural control. Patient performed exercise with therapist guidance in order to accurately use pacer function, avoid valsalva, and optimally exert effort within a safe and effective range via the Daquan Exertion Rating Scale. Patient instructed to perform at a midrange of exertion and to complete 15-20 repetitions within appropriate split time, with proper technique, and while maintaining safety.         10/3/2023    10:47 AM   [facility]   Visit Number 4   VAS Pain Rating 8   Time 5   Extension in Lying 20   Extension in Standing 20   Lumbar Weight 35 lbs   Repetitions 20   Rating of Perceived Exertion 3   Ice - Z Lie (in min.) 5     -Cues to smooth pace instead of starting and stopping in order to improve motor control  -Cues to concentrate on extension hold to promote upright standing endurance and posture  -Cues to Focus on pushing with the back instead of the legs    Mariama participated in therapeutic exercises to develop strength, endurance, ROM, flexibility, and core stabilization for 50 minutes includin min recumbent bike patient note high subjective LBP    LTR x 20  Prone lying 2 min   ANDREA 2 min   REIL x 20 cue for elbow position   RUFUS x 20 cue for post support to reach end range    Amb 100 ft x 2  for patient note change improved sx presenation    NP to return HEP focus extension   PPT x10  Bridge x10, 3' (limited lift)  Supine HS stretch         Peripheral muscle strengthening which included one set of 15-20 repetitions at a slow and controlled 10-13 second per rep pace focused on strengthening supporting musculature in order to improve body mechanics and functional mobility. Patient and therapist focused on proper form during treatment to ensure optimal strengthening of each targeted muscle group.  Machines utilized include torso rotation, leg extension, leg curl, chest press, upright row. Tricep extension, bicep curl, leg press, and hip abduction added visit 3    Pt given cold pack for 5  minutes to low back  in supine.    Patient Education and Home Exercises     Home exercises include:  Prone on elbows   Extension in Lying   Extension in standing       Cardio program (V5): -  Lifting education (V11): -  Posture/Lumbar roll: uses pillow in recliner   [employer] Discharge handout (date given): -  DME owned/used: [employer] (stretcher)  Gym Membership: In her building, stationary bike and treadmill, will use every now and then sometime goes to do chair exercises      Education provided:   - Patient received education in benefits of progressing mobility and strengthening gradually      Written Home Exercises Provided: Patient instructed to cont prior HEP.  Exercises were reviewed and Mariama was able to demonstrate them prior to the end of the session.  Mariama demonstrated good  understanding of the education provided.     See EMR under Patient Instructions for exercises provided 9/6/2023.    Assessment     Patient return to tx after extended time due to schedule lapse. Patient now /c regular visits scheduled and plans regular attendance for sx tx progressions.  HEP focus of tx today including improving technique for repeated extensions especially in standing.  Patient continue to report sig reduction in LB  discomfort /c repeated extension indicating appropriateness of continue performance. Plan to return to core strengthening exercises next visit.  Considering extended time since patient last visit, lumbar MedX resistance maintained to assess exercise tolerance.   Today pt perform 20 reps at 3 RPE indicating no decline in strength.  Progress per HB protocol.  Patient complete full complement of peripheral exercises /s issue.  Progress per patient tolerance.        Patient is making fair progress towards established goals.  Pt will continue to benefit from skilled outpatient physical therapy to address the deficits stated in the impairment chart, provide pt/family education and to maximize pt's level of independence in the home and community environment.     Anticipated Barriers for therapy: None  Pt's spiritual, cultural and educational needs considered and pt agreeable to plan of care and goals as stated below:     GOALS: Pt is in agreement with the following goals.     Short term goals:  6 weeks or 10 visits   - Pt will demonstrate increased lumbar ROM by at least 3 degrees from the initial ROM value with improvements noted in functional ROM and ability to perform ADLs. Appropriate and Ongoing  - Pt will demonstrate increased MedX average isometric strength value by 15% from initial test resulting in improved ability to perform bending, lifting, and carrying activities safely, confidently. Appropriate and Ongoing  - Pt will report a reduction in worst pain score by 1-2 points for improved tolerance for static standing unsupported. Appropriate and Ongoing  - Pt able to perform HEP correctly with minimal cueing or supervision from therapist to encourage independent management of symptoms. Appropriate and Ongoing     Long term goals: 10 weeks or 20 visits   - Pt will demonstrate increased lumbar ROM by at least 6 degrees from initial ROM value, resulting in improved ability to perform functional forward bending while  standing and sitting. Appropriate and Ongoing  - Pt will demonstrate increased MedX average isometric strength value by 25% from initial test resulting in improved ability to perform bending, lifting, and carrying activities safely and confidently. Appropriate and Ongoing  - Pt to demonstrate ability to independently control and reduce their pain through posture positioning and mechanical movements throughout a typical day. Appropriate and Ongoing  - Pt will demonstrate reduced pain and improved functional outcomes as reported on the FOTO by reaching a limitation score of < or = 15% or less in order to demonstrate subjective improvement in pt's condition.   Appropriate and Ongoing  - Pt will demonstrate independence with the HEP at discharge. Appropriate and Ongoing  - Pt able to walk in the grocery store with out needing to lean on the basket(patient goal) Appropriate and Ongoing    Plan     Continue with established Plan of Care towards established PT goals.     Therapist: Zach Xavier, PT  10/3/2023

## 2023-10-13 ENCOUNTER — CLINICAL SUPPORT (OUTPATIENT)
Dept: REHABILITATION | Facility: OTHER | Age: 73
End: 2023-10-13
Payer: MEDICARE

## 2023-10-13 DIAGNOSIS — R29.898 DECREASED STRENGTH OF TRUNK AND BACK: ICD-10-CM

## 2023-10-13 DIAGNOSIS — R68.89 DECREASED ACTIVITY TOLERANCE: Primary | ICD-10-CM

## 2023-10-13 PROCEDURE — 97110 THERAPEUTIC EXERCISES: CPT | Mod: CQ

## 2023-10-13 PROCEDURE — 97112 NEUROMUSCULAR REEDUCATION: CPT | Mod: CQ

## 2023-10-13 NOTE — PROGRESS NOTES
[facility]  Physical Therapy Treatment Note     Name: Mariama Mcdowell  Clinic Number: [id number]    Therapy Diagnosis:   Encounter Diagnoses   Name Primary?    Decreased activity tolerance Yes    Decreased strength of trunk and back        Physician: Juan Mcdermott MD    Visit Date: 10/13/2023    Physician Orders: PT Eval and Treat  Medical Diagnosis from Referral:   1. DDD (degenerative disc disease), lumbar    2. Spondylolysis of lumbar region    3. Decreased activity tolerance    4. Decreased strength of trunk and back       Evaluation Date: 2023  Authorization Period Expiration: 2023  Plan of Care Expiration: 2023  Reassessment Due: 2023  Visit # / Visits authorized:      Time In: 10:30  Time Out: 11:30  Total Billable Time: 55 minutes 1:1 30 mins  Total Treatment Time: 60 min   INSURANCE and OUTCOMES: Value Based Insurance with FOTO Outcomes 1/3     Precautions: standard     Pattern of pain determined: 4 PEP    Subjective     Mariama Mcdowell reports significant nerve pain, has to take tylenol every morning.       Patient reports tolerating previous visit well /c no c/o of excess discomfort.   Patient reports their pain to be 8/10 on a 0-10 scale with 0 being no pain and 10 being the worst pain imaginable.  Pain Location: Low back and bilateral legs to the thighs      Occupation: no  Leisure: Muslim, activity in the building she lives in (arts and crafts, [employer], parties)  Pts goals: I want the sciatic to stop    Objective         Baseline Isometric Testing on Med X equipment: Testing administered by PT     Date of testin2023  ROM 0-42 deg   Max Peak Torque 91    Min Peak Torque 20    Flex/Ext Ratio 4.55:1   % below normative data 48   Start at 35 ft lbs    Intake/Outcome for FOTO Lumbar Surveys     Therapist reviewed FOTO scores for Mariama Mcdowell on 2023  FOTO documents entered into [employer] - see Media section.     Intake Score: 32%   Visit 5  "Score:   Visit 10 Score:   Discharge Score:   Goal Score: 15%           Treatment     Mariama received the treatments listed below:      Mariama received neuromuscular education for 10 minutes via participation on the [employer] Machine. Therapist assisted patient in isolating and engaging spinal stabilization musculature in order to improve functional ability and postural control. Patient performed exercise with therapist guidance in order to accurately use pacer function, avoid valsalva, and optimally exert effort within a safe and effective range via the Daquan Exertion Rating Scale. Patient instructed to perform at a midrange of exertion and to complete 15-20 repetitions within appropriate split time, with proper technique, and while maintaining safety.       -Cues to smooth pace instead of starting and stopping in order to improve motor control  -Cues to concentrate on extension hold to promote upright standing endurance and posture  -Cues to Focus on pushing with the back instead of the legs        10/13/2023    10:30 AM   [facility]   Visit Number 5   VAS Pain Rating 8   Treadmill Time (in min.) 5 min   Extension in Lying 15   Lumbar Weight 39 lbs   Repetitions 20   Rating of Perceived Exertion 4        Mariama participated in therapeutic exercises to develop strength, endurance, ROM, flexibility, and core stabilization for 45 minutes includin min recumbent bike patient note high subjective LBP    LTR x 20  Prone lying 2 min   ANDREA 1 min   +Sciatic nerve glides R x20  +Piriformis stretch R only 2x30"  REIL x 15 cue for elbow position   RUFUS x 10 cue for post support to reach end range  PPT x10,3"  Bridge x10, 3' (limited lift)  Supine HS stretch         Peripheral muscle strengthening which included one set of 15-20 repetitions at a slow and controlled 10-13 second per rep pace focused on strengthening supporting musculature in order to improve body mechanics and functional mobility. Patient and " therapist focused on proper form during treatment to ensure optimal strengthening of each targeted muscle group.  Machines utilized include torso rotation, leg extension, leg curl, chest press, upright row. Tricep extension, bicep curl, leg press, and hip abduction added visit 3    Pt given cold pack for 5  minutes to low back  in supine.    Patient Education and Home Exercises     Home exercises include:  +Sciatic nerve glides, piriformis stretch added 10/13/13  Prone on elbows   Extension in Lying   Extension in standing       Cardio program (V5): - Discussed and provided handout 10/13/23  Lifting education (V11): -  Posture/Lumbar roll: uses pillow in recliner   Fridge [employer] Discharge handout (date given): -  DME owned/used: [employer] (stretcher)  Gym Membership: In her building, stationary bike and treadmill, will use every now and then sometime goes to do chair exercises      Education provided:   - Patient received education in benefits of progressing mobility and strengthening gradually      Written Home Exercises Provided: Patient instructed to cont prior HEP.  Exercises were reviewed and Mariama was able to demonstrate them prior to the end of the session.  Mariama demonstrated good  understanding of the education provided.     See EMR under Patient Instructions for exercises provided 9/6/2023.    Assessment   Patient returns to PT with complaints of B hip pain that is the worst in the morning and subsides sometimes and temporarily with ibuprofen. Discussed the importance of more  frequent extension based exercises, sleeping positions and stretching in AM/PM. Discussed benefits of cardio program per HB protocol. Unable to progress strengthening and stabilization this visit due to time constraints, will resume NV. Patient able to perform 20 reps on l/s medx machine with an RPE of 4/10  Progress per patient tolerance.        Patient is making fair progress towards established goals.  Pt will continue to benefit from  skilled outpatient physical therapy to address the deficits stated in the impairment chart, provide pt/family education and to maximize pt's level of independence in the home and community environment.     Anticipated Barriers for therapy: None  Pt's spiritual, cultural and educational needs considered and pt agreeable to plan of care and goals as stated below:     GOALS: Pt is in agreement with the following goals.     Short term goals:  6 weeks or 10 visits   - Pt will demonstrate increased lumbar ROM by at least 3 degrees from the initial ROM value with improvements noted in functional ROM and ability to perform ADLs. Appropriate and Ongoing  - Pt will demonstrate increased MedX average isometric strength value by 15% from initial test resulting in improved ability to perform bending, lifting, and carrying activities safely, confidently. Appropriate and Ongoing  - Pt will report a reduction in worst pain score by 1-2 points for improved tolerance for static standing unsupported. Appropriate and Ongoing  - Pt able to perform HEP correctly with minimal cueing or supervision from therapist to encourage independent management of symptoms. Appropriate and Ongoing     Long term goals: 10 weeks or 20 visits   - Pt will demonstrate increased lumbar ROM by at least 6 degrees from initial ROM value, resulting in improved ability to perform functional forward bending while standing and sitting. Appropriate and Ongoing  - Pt will demonstrate increased MedX average isometric strength value by 25% from initial test resulting in improved ability to perform bending, lifting, and carrying activities safely and confidently. Appropriate and Ongoing  - Pt to demonstrate ability to independently control and reduce their pain through posture positioning and mechanical movements throughout a typical day. Appropriate and Ongoing  - Pt will demonstrate reduced pain and improved functional outcomes as reported on the FOTO by reaching a  limitation score of < or = 15% or less in order to demonstrate subjective improvement in pt's condition.   Appropriate and Ongoing  - Pt will demonstrate independence with the HEP at discharge. Appropriate and Ongoing  - Pt able to walk in the grocery store with out needing to lean on the basket(patient goal) Appropriate and Ongoing    Plan     Continue with established Plan of Care towards established PT goals.     Therapist: Korina Ponce, PTA  10/13/2023

## 2023-10-18 ENCOUNTER — CLINICAL SUPPORT (OUTPATIENT)
Dept: REHABILITATION | Facility: OTHER | Age: 73
End: 2023-10-18
Payer: MEDICARE

## 2023-10-18 DIAGNOSIS — R68.89 DECREASED ACTIVITY TOLERANCE: Primary | ICD-10-CM

## 2023-10-18 DIAGNOSIS — R29.898 DECREASED STRENGTH OF TRUNK AND BACK: ICD-10-CM

## 2023-10-18 PROCEDURE — 97110 THERAPEUTIC EXERCISES: CPT

## 2023-10-18 NOTE — PROGRESS NOTES
[facility]  Physical Therapy Treatment Note     Name: Mariama Mcdowell  Clinic Number: [id number]    Therapy Diagnosis:   No diagnosis found.      Physician: Juan Mcdermott MD    Visit Date: 10/18/2023    Physician Orders: PT Eval and Treat  Medical Diagnosis from Referral:   1. DDD (degenerative disc disease), lumbar    2. Spondylolysis of lumbar region    3. Decreased activity tolerance    4. Decreased strength of trunk and back       Evaluation Date: 2023  Authorization Period Expiration: 2023  Plan of Care Expiration: 2023  Reassessment Due: 2023  Visit # / Visits authorized:      Time In: 10:30 AM  Time Out: 11:23 AM  Total Billable Time: 53 minutes 1:1   Total Treatment Time: 53 min   INSURANCE and OUTCOMES: Value Based Insurance with FOTO Outcomes 1/3     Precautions: standard     Pattern of pain determined: 4 PEP    Subjective     Mariama Mcdowell reports high LBP with radiating symptoms down the back of both legs this morning. Patient states that she has been doing her exercises.     Patient reports tolerating previous visit well /c no c/o of excess discomfort.   Patient reports their pain to be 8/10 on a 0-10 scale with 0 being no pain and 10 being the worst pain imaginable.  Pain Location: Low back and bilateral legs to the thighs      Occupation: no  Leisure: Shinto, activity in the building she lives in (arts and crafts, [employer], parties)  Pts goals: I want the sciatic to stop    Objective         Baseline Isometric Testing on Med X equipment: Testing administered by PT     Date of testin2023  ROM 0-42 deg   Max Peak Torque 91    Min Peak Torque 20    Flex/Ext Ratio 4.55:1   % below normative data 48   Start at 35 ft lbs    Intake/Outcome for FOTO Lumbar Surveys     Therapist reviewed FOTO scores for Mariama Mcdowell on 2023  FOTO documents entered into [employer] - see Media section.     Intake Score: 32%   Visit 5 Score:   Visit 10 Score:  "  Discharge Score:   Goal Score: 15%           Treatment     Mariama received the treatments listed below:      Mariama received neuromuscular education for 0 minutes via participation on the Aframe Machine. Therapist assisted patient in isolating and engaging spinal stabilization musculature in order to improve functional ability and postural control. Patient performed exercise with therapist guidance in order to accurately use pacer function, avoid valsalva, and optimally exert effort within a safe and effective range via the [place] Exertion Rating Scale. Patient instructed to perform at a midrange of exertion and to complete 15-20 repetitions within appropriate split time, with proper technique, and while maintaining safety.       Mariama participated in therapeutic exercises to develop strength, endurance, ROM, flexibility, and core stabilization for 53 minutes includin min recumbent bike patient note high subjective LBP    LTR x 20  ANDREA 1 min   Sciatic nerve glides R x20  REIL x 15 cue for elbow position   RUFUS x 10 cue for post support to reach end range  PPT x10,3"  Bridge x10, 3' (limited lift)  +Hook lying TrA w/ball & SLR x 10  +Hook lying TrA w/marching x 10    NP: Supine HS stretch   Prone lying 2 min   Piriformis stretch R only 2x30"        10/18/2023    10:53 AM   [facility]   Visit Number 6   VAS Pain Rating 8   Treadmill Time (in min.) 5 min   Extension in Lying 10   Extension in Standing 10   Lumbar Weight 41 lbs   Repetitions 15   Rating of Perceived Exertion 3   Ice - Z Lie (in min.) 0     Peripheral muscle strengthening which included one set of 15-20 repetitions at a slow and controlled 10-13 second per rep pace focused on strengthening supporting musculature in order to improve body mechanics and functional mobility. Patient and therapist focused on proper form during treatment to ensure optimal strengthening of each targeted muscle group.  Machines utilized include torso rotation, " leg extension, leg curl, chest press, upright row. Tricep extension, bicep curl, leg press, and hip abduction added visit 3    Pt given cold pack for 0 minutes to low back  in supine.    Patient Education and Home Exercises     Home exercises include:  Sciatic nerve glides, piriformis stretch added 10/13/13  Prone on elbows   Extension in Lying   Extension in standing   + Bridges  + Hook lying TrA w/marching   + LTR    Cardio program (V5): - Discussed and provided handout 10/13/23  Lifting education (V11): -  Posture/Lumbar roll: uses pillow in recliner   Fridge [employer]et Discharge handout (date given): -  DME owned/used: [employer] (stretcher)  Gym Membership: In her building, stationary bike and treadmill, will use every now and then sometime goes to do chair exercises    Education provided:   - Patient received education in benefits of progressing mobility and strengthening gradually  -Centralization vs. Peripheralization     Written Home Exercises Provided: Patient instructed to cont prior HEP.  Exercises were reviewed and Mariama was able to demonstrate them prior to the end of the session.  Mariama demonstrated good  understanding of the education provided.     See EMR under Patient Instructions for exercises provided 9/6/2023.    Assessment   Patient presents with report of bilateral lumbar radicular symptoms that do not descend past the knee. Provided patient education for centralization versus peripheralization of radicular symptoms. Patient required min. Verbal cues for core stabilization and was able to incorporate lower extremity movement with TrA stabilization in supine. Updated HEP to integrate additional core strengthening. Patient showed improved pacing on MedX and consistent firing of lumbar extensions through complete range. Progressed patient's lumbar MedX resistance to 41 ft. Lbs. And patient was able to complete 15 reps with an RPE of 3/10. Patient completed entire peripheral strength circuit without  complaint. Progress per patient tolerance.      Patient is making fair progress towards established goals.  Pt will continue to benefit from skilled outpatient physical therapy to address the deficits stated in the impairment chart, provide pt/family education and to maximize pt's level of independence in the home and community environment.     Anticipated Barriers for therapy: None  Pt's spiritual, cultural and educational needs considered and pt agreeable to plan of care and goals as stated below:     GOALS: Pt is in agreement with the following goals.     Short term goals:  6 weeks or 10 visits   - Pt will demonstrate increased lumbar ROM by at least 3 degrees from the initial ROM value with improvements noted in functional ROM and ability to perform ADLs. Appropriate and Ongoing  - Pt will demonstrate increased MedX average isometric strength value by 15% from initial test resulting in improved ability to perform bending, lifting, and carrying activities safely, confidently. Appropriate and Ongoing  - Pt will report a reduction in worst pain score by 1-2 points for improved tolerance for static standing unsupported. Appropriate and Ongoing  - Pt able to perform HEP correctly with minimal cueing or supervision from therapist to encourage independent management of symptoms. Appropriate and Ongoing     Long term goals: 10 weeks or 20 visits   - Pt will demonstrate increased lumbar ROM by at least 6 degrees from initial ROM value, resulting in improved ability to perform functional forward bending while standing and sitting. Appropriate and Ongoing  - Pt will demonstrate increased MedX average isometric strength value by 25% from initial test resulting in improved ability to perform bending, lifting, and carrying activities safely and confidently. Appropriate and Ongoing  - Pt to demonstrate ability to independently control and reduce their pain through posture positioning and mechanical movements throughout a  typical day. Appropriate and Ongoing  - Pt will demonstrate reduced pain and improved functional outcomes as reported on the FOTO by reaching a limitation score of < or = 15% or less in order to demonstrate subjective improvement in pt's condition.   Appropriate and Ongoing  - Pt will demonstrate independence with the HEP at discharge. Appropriate and Ongoing  - Pt able to walk in the grocery store with out needing to lean on the basket(patient goal) Appropriate and Ongoing    Plan     Continue with established Plan of Care towards established PT goals.     Therapist: Shawna Avalos, PT  10/18/2023

## 2023-10-19 NOTE — PROGRESS NOTES
[facility]  Physical Therapy Treatment Note     Name: Mariama Mcdowell  Clinic Number: [id number]    Therapy Diagnosis:   Encounter Diagnoses   Name Primary?    Decreased activity tolerance Yes    Decreased strength of trunk and back          Physician: Juan Mcdermott MD    Visit Date: 10/20/2023    Physician Orders: PT Eval and Treat  Medical Diagnosis from Referral:   1. DDD (degenerative disc disease), lumbar    2. Spondylolysis of lumbar region    3. Decreased activity tolerance    4. Decreased strength of trunk and back       Evaluation Date: 2023  Authorization Period Expiration: 2023  Plan of Care Expiration: 2023  Reassessment Due: 2023  Visit # / Visits authorized:      Time In: 10:25 AM  Time Out: 11:23 AM  Total Billable Time: 53 minutes 1:1 40 mins  Total Treatment Time: 55 min   INSURANCE and OUTCOMES: Value Based Insurance with FOTO Outcomes 1/3     Precautions: standard     Pattern of pain determined: 4 PEP    Subjective     Mariama Mcdowell reports no change in radicular symptoms in the morning, but stretches do help.       Patient reports tolerating previous visit well /c no c/o of excess discomfort.   Patient reports their pain to be 7/10 on a 0-10 scale with 0 being no pain and 10 being the worst pain imaginable.  Pain Location: Low back and bilateral legs to the thighs      Occupation: no  Leisure: Sikhism, activity in the building she lives in (arts and [employer], [employer], parties)    Pts goals: I want the sciatic to stop  Objective         Baseline Isometric Testing on Med X equipment: Testing administered by PT     Date of testin2023  ROM 0-42 deg   Max Peak Torque 91    Min Peak Torque 20    Flex/Ext Ratio 4.55:1   % below normative data 48   Start at 35 ft lbs    Intake/Outcome for FOTO Lumbar Surveys     Therapist reviewed FOTO scores for Mariama Mcdowell on 2023  FOTO documents entered into [employer] - see Media section.     Intake  "Score: 32%   Visit 5 Score:   Visit 10 Score:   Discharge Score:   Goal Score: 15%        Treatment     Mariama received the treatments listed below:        Mariama participated in therapeutic exercises to develop strength, endurance, ROM, flexibility, and core stabilization for 55 minutes including:      ANDREA 1 min   Sciatic nerve glides R x20  REIL x 15 cue for elbow position   RUFUS x 10 cue for post support to reach end range  PPT x10,3"  Bridge x15,3'   Hook lying TrA w/ball & SLR x10  Hook lying TrA w/marching x10  +BKFO w/RTB x10    NP:   LTR x 20  Supine HS stretch   Prone lying 2 min   Piriformis stretch R only 2x30"        10/20/2023    10:44 AM   [facility]   Visit Number 7   VAS Pain Rating 7   Treadmill Time (in min.) 5 min   Extension in Lying 15   Lumbar Weight 41 lbs   Repetitions 20   Rating of Perceived Exertion 4          Peripheral muscle strengthening which included one set of 15-20 repetitions at a slow and controlled 10-13 second per rep pace focused on strengthening supporting musculature in order to improve body mechanics and functional mobility. Patient and therapist focused on proper form during treatment to ensure optimal strengthening of each targeted muscle group.  Machines utilized include torso rotation, leg extension, leg curl, chest press, upright row. Tricep extension, bicep curl, leg press, and hip abduction added visit 3    Pt given cold pack for 0 minutes to low back  in supine.    Patient Education and Home Exercises     Home exercises include:  Sciatic nerve glides, piriformis stretch added 10/13/13  Prone on elbows   Extension in Lying   Extension in standing   + Bridges  + Hook lying TrA w/marching   + LTR    Cardio program (V5): - Discussed and provided handout 10/13/23  Lifting education (V11): -  Posture/Lumbar roll: uses pillow in recliner   Fridge [employer] Discharge handout (date given): -  DME owned/used: [employer] (stretcher)  Gym Membership: In her building, " stationary bike and treadmill, will use every now and then sometime goes to do chair exercises    Education provided:   - Patient received education in benefits of progressing mobility and strengthening gradually  -Centralization vs. Peripheralization     Written Home Exercises Provided: Patient instructed to cont prior HEP.  Exercises were reviewed and Mariama was able to demonstrate them prior to the end of the session.  Mariama demonstrated good  understanding of the education provided.     See EMR under Patient Instructions for exercises provided 9/6/2023.    Assessment   Patient tolerated continuation and progression of core and lumbopelvic stabilization exercises without adverse effects and appropriate challenge. Patient required moderate vc/tc to ensure proper form with exercises to ensure utilization of correct muscle groups and to avoid substitutions.  Patient able to perform 20 reps on l/s medx machine with an RPE of 4/10  Progress per patient tolerance.      Patient is making fair progress towards established goals.  Pt will continue to benefit from skilled outpatient physical therapy to address the deficits stated in the impairment chart, provide pt/family education and to maximize pt's level of independence in the home and community environment.     Anticipated Barriers for therapy: None  Pt's spiritual, cultural and educational needs considered and pt agreeable to plan of care and goals as stated below:     GOALS: Pt is in agreement with the following goals.     Short term goals:  6 weeks or 10 visits   - Pt will demonstrate increased lumbar ROM by at least 3 degrees from the initial ROM value with improvements noted in functional ROM and ability to perform ADLs. Appropriate and Ongoing  - Pt will demonstrate increased MedX average isometric strength value by 15% from initial test resulting in improved ability to perform bending, lifting, and carrying activities safely, confidently. Appropriate and  Ongoing  - Pt will report a reduction in worst pain score by 1-2 points for improved tolerance for static standing unsupported. Appropriate and Ongoing  - Pt able to perform HEP correctly with minimal cueing or supervision from therapist to encourage independent management of symptoms. Appropriate and Ongoing     Long term goals: 10 weeks or 20 visits   - Pt will demonstrate increased lumbar ROM by at least 6 degrees from initial ROM value, resulting in improved ability to perform functional forward bending while standing and sitting. Appropriate and Ongoing  - Pt will demonstrate increased MedX average isometric strength value by 25% from initial test resulting in improved ability to perform bending, lifting, and carrying activities safely and confidently. Appropriate and Ongoing  - Pt to demonstrate ability to independently control and reduce their pain through posture positioning and mechanical movements throughout a typical day. Appropriate and Ongoing  - Pt will demonstrate reduced pain and improved functional outcomes as reported on the FOTO by reaching a limitation score of < or = 15% or less in order to demonstrate subjective improvement in pt's condition.   Appropriate and Ongoing  - Pt will demonstrate independence with the HEP at discharge. Appropriate and Ongoing  - Pt able to walk in the grocery store with out needing to lean on the basket(patient goal) Appropriate and Ongoing    Plan     Continue with established Plan of Care towards established PT goals.     Therapist: Korina Ponce, PTA  10/20/2023

## 2023-10-20 ENCOUNTER — CLINICAL SUPPORT (OUTPATIENT)
Dept: REHABILITATION | Facility: OTHER | Age: 73
End: 2023-10-20
Payer: MEDICARE

## 2023-10-20 DIAGNOSIS — R68.89 DECREASED ACTIVITY TOLERANCE: Primary | ICD-10-CM

## 2023-10-20 DIAGNOSIS — R29.898 DECREASED STRENGTH OF TRUNK AND BACK: ICD-10-CM

## 2023-10-20 PROCEDURE — 97110 THERAPEUTIC EXERCISES: CPT | Mod: CQ

## 2023-10-25 ENCOUNTER — CLINICAL SUPPORT (OUTPATIENT)
Dept: REHABILITATION | Facility: OTHER | Age: 73
End: 2023-10-25
Payer: MEDICARE

## 2023-10-25 DIAGNOSIS — R29.898 DECREASED STRENGTH OF TRUNK AND BACK: ICD-10-CM

## 2023-10-25 DIAGNOSIS — R68.89 DECREASED ACTIVITY TOLERANCE: Primary | ICD-10-CM

## 2023-10-25 PROCEDURE — 97112 NEUROMUSCULAR REEDUCATION: CPT

## 2023-10-25 PROCEDURE — 97110 THERAPEUTIC EXERCISES: CPT

## 2023-10-25 NOTE — PROGRESS NOTES
[facility]  Physical Therapy Treatment Note     Name: Mariama Mcdowell  Clinic Number: [id number]    Therapy Diagnosis:   Encounter Diagnoses   Name Primary?    Decreased activity tolerance Yes    Decreased strength of trunk and back        Physician: Juan Mcdermott MD    Visit Date: 10/25/2023    Physician Orders: PT Eval and Treat  Medical Diagnosis from Referral:   1. DDD (degenerative disc disease), lumbar    2. Spondylolysis of lumbar region    3. Decreased activity tolerance    4. Decreased strength of trunk and back       Evaluation Date: 2023  Authorization Period Expiration: 2023  Plan of Care Expiration: 2023  Reassessment Due: 2023  Visit # / Visits authorized:      Time In: 10:35 AM  Time Out: 11:45 AM  Total Billable Time: 65 minutes   Total Treatment Time: 70 min   INSURANCE and OUTCOMES: Value Based Insurance with FOTO Outcomes 2/3     Precautions: standard     Pattern of pain determined: 4 PEP    Subjective     Mariama Mcdowell reports continued higher pain levels in morning; this morning was 10/10. Pain continues to reduce with movement throughout the day.        Patient reports tolerating previous visit well /c no c/o of excess discomfort.   Patient reports their pain to be 8/10 on a 0-10 scale with 0 being no pain and 10 being the worst pain imaginable.  Pain Location: Low back and bilateral legs to the thighs      Occupation: no  Leisure: [employer], activity in the building she lives in (arts and [email or web address], [employer], [employer])    Pts goals: I want the sciatic to stop  Objective         Baseline Isometric Testing on Med X equipment: Testing administered by PT     Date of testin2023  ROM 0-42 deg   Max Peak Torque 91    Min Peak Torque 20    Flex/Ext Ratio 4.55:1   % below normative data 48   Start at 35 ft lbs    MOVEMENT LOSS - Lumbar    Norms ROM Loss Initial ROM Loss 10/25/2023   Flexion Fingers touch toes, sacral angle >/= 70 deg, uniform  "spinal curvature, posterior weight shift  within functional limits P! With movement WFL, Painless    Extension ASIS surpasses toes, spine of scapulae surpasses heels, uniform spinal curve moderate loss Moderate loss    Side glide Right   moderate loss P! On R Minimal loss    Side glide Left  minimal loss "better on that side" Moderate loss    Rotation Right PT observes contralateral shoulder moderate loss P! Moderate loss    Rotation Left PT observes contralateral shoulder minimal loss Moderate loss       Lower Extremity Strength  Right LE   Left LE     Hip flexion: 4+/5 Hip flexion: 4+/5   Hip abduction: 4+/5 Hip abduction: 4+/5   Hip adduction:  4+/5 Hip adduction:  4+/5   Hip External Rotation 4+/5 Hip External Rotation 4+/5   Hip Internal rotation    4+/5 Hip Internal rotation 4+/5        Intake/Outcome for FOTO Lumbar Surveys     Therapist reviewed FOTO scores for Mariama Mcdowell on 6/28/2023  FOTO documents entered into [employer] - see Media section.     Intake Score: 32%   Visit 8 Score: 22%  Visit 10 Score:   Discharge Score:   Goal Score: 15%        Treatment     Mariama received the treatments listed below:      Mariama participated in neuromuscular re-education to develop coordination and control for 25 minutes, including:     PPT x10 ,3"  Bridge x15, +RTB 3"  Hook lying TrA w/ball & SLR x10ea  BKFO w/ RTB x10  +Quadruped kick back x10    Mariama participated in therapeutic exercises to develop strength, endurance, ROM, flexibility, and core stabilization for 40 minutes including:    ANDREA 1 min   Sciatic nerve glides B 2x10  REIL x 15 cue for elbow position   RUFUS x 10 cue for post support to reach end range    NP:   LTR x 20  Supine HS stretch   Prone lying 2 min   Piriformis stretch R only 2x30"  Hook lying TrA w/marching x10        10/25/2023    11:44 AM   [facility]   Visit Number 8   VAS Pain Rating 6   Treadmill Time (in min.) 5 min   Extension in Lying 10   Extension in Standing 10   Lumbar Weight 46 lbs "   Repetitions 15   Rating of Perceived Exertion 3   Ice - Z Lie (in min.) 5         Peripheral muscle strengthening which included one set of 15-20 repetitions at a slow and controlled 10-13 second per rep pace focused on strengthening supporting musculature in order to improve body mechanics and functional mobility. Patient and therapist focused on proper form during treatment to ensure optimal strengthening of each targeted muscle group.  Machines utilized include torso rotation, leg extension, leg curl, chest press, upright row. Tricep extension, bicep curl, leg press, and hip abduction added visit 3    Pt given cold pack for 5 minutes to low back  in supine.    Patient Education and Home Exercises     Home exercises include:  Sciatic nerve glides, piriformis stretch added 10/13/13  Prone on elbows   Extension in Lying   Extension in standing   + Bridges + RTB  + Hook lying TrA w/marching   + LTR    Cardio program (V5): - Discussed and provided handout 10/13/23  Lifting education (V11): -  Posture/Lumbar roll: uses pillow in recliner   Fridge Magnet Discharge handout (date given): -  DME owned/used: [employer] (stretcher)  Gym Membership: In her building, stationary bike and treadmill, will use every now and then sometime goes to do chair exercises    Education provided:   - Patient received education in benefits of progressing mobility and strengthening gradually      Written Home Exercises Provided: Patient instructed to cont prior HEP.  Exercises were reviewed and Mariama was able to demonstrate them prior to the end of the session.  Mariama demonstrated good  understanding of the education provided.     See EMR under Patient Instructions for exercises provided 9/6/2023.    Assessment     Reassessment performed with Mariama demonstrating minimal changes in trunk active range of motion; however, improved tolerance for end range motion noted, as only stiffness noted at end range rather than pain. Slight improvements in hip  strength also noted. Based on progress, gradual progression of trunk and hip strengthening continued without adverse effects. Cueing required for improved sequencing and focus on trunk support, though overall good performance demonstrated. Mariama was able to perform 15 reps on l/s medx machine at newly progressed resistance with an RPE of 3/10. Plan to progress further next visit.     Patient is making fair progress towards established goals.  Pt will continue to benefit from skilled outpatient physical therapy to address the deficits stated in the impairment chart, provide pt/family education and to maximize pt's level of independence in the home and community environment.     Anticipated Barriers for therapy: None  Pt's spiritual, cultural and educational needs considered and pt agreeable to plan of care and goals as stated below:     GOALS: Pt is in agreement with the following goals.     Short term goals:  6 weeks or 10 visits   - Pt will demonstrate increased lumbar ROM by at least 3 degrees from the initial ROM value with improvements noted in functional ROM and ability to perform ADLs. Appropriate and Ongoing  - Pt will demonstrate increased MedX average isometric strength value by 15% from initial test resulting in improved ability to perform bending, lifting, and carrying activities safely, confidently. Appropriate and Ongoing  - Pt will report a reduction in worst pain score by 1-2 points for improved tolerance for static standing unsupported. Appropriate and Ongoing  - Pt able to perform HEP correctly with minimal cueing or supervision from therapist to encourage independent management of symptoms. Appropriate and Ongoing     Long term goals: 10 weeks or 20 visits   - Pt will demonstrate increased lumbar ROM by at least 6 degrees from initial ROM value, resulting in improved ability to perform functional forward bending while standing and sitting. Appropriate and Ongoing  - Pt will demonstrate increased  MedX average isometric strength value by 25% from initial test resulting in improved ability to perform bending, lifting, and carrying activities safely and confidently. Appropriate and Ongoing  - Pt to demonstrate ability to independently control and reduce their pain through posture positioning and mechanical movements throughout a typical day. Appropriate and Ongoing  - Pt will demonstrate reduced pain and improved functional outcomes as reported on the FOTO by reaching a limitation score of < or = 15% or less in order to demonstrate subjective improvement in pt's condition.   Appropriate and Ongoing  - Pt will demonstrate independence with the HEP at discharge. Appropriate and Ongoing  - Pt able to walk in the grocery store with out needing to lean on the basket(patient goal) Appropriate and Ongoing    Plan     Continue with established Plan of Care towards established PT goals.     Therapist: SUNDEEP NIX, PT  10/30/2023

## 2024-08-23 ENCOUNTER — TELEPHONE (OUTPATIENT)
Dept: PAIN MEDICINE | Facility: CLINIC | Age: 74
End: 2024-08-23
Payer: MEDICARE

## 2024-08-23 NOTE — TELEPHONE ENCOUNTER
----- Message from Edi Eugene sent at 8/23/2024  2:32 PM CDT -----   Name of Who is Calling:     What is the request in detail:  request call back in reference to status on referral  fax on  08/22/24 Please contact to further discuss and advise      Can the clinic reply by [facility]:     [facility]  [facility] / Renee simms / Yogesh#[phone]

## 2024-08-23 NOTE — TELEPHONE ENCOUNTER
Staff called pt about message that was left with the call center.staff spoke with [facility] care employee [facility]. She stated that she will be re faxing the referral for  TO SIGN.

## 2024-09-13 ENCOUNTER — LAB VISIT (OUTPATIENT)
Dept: LAB | Facility: OTHER | Age: 74
End: 2024-09-13
Payer: MEDICARE

## 2024-09-13 ENCOUNTER — OFFICE VISIT (OUTPATIENT)
Dept: INTERNAL MEDICINE | Facility: CLINIC | Age: 74
End: 2024-09-13
Attending: FAMILY MEDICINE
Payer: MEDICARE

## 2024-09-13 VITALS
BODY MASS INDEX: 28.44 KG/M2 | HEIGHT: 68 IN | WEIGHT: 187.63 LBS | DIASTOLIC BLOOD PRESSURE: 60 MMHG | HEART RATE: 68 BPM | OXYGEN SATURATION: 98 % | SYSTOLIC BLOOD PRESSURE: 98 MMHG

## 2024-09-13 DIAGNOSIS — R73.9 BLOOD GLUCOSE ELEVATED: ICD-10-CM

## 2024-09-13 DIAGNOSIS — E78.00 ELEVATED LDL CHOLESTEROL LEVEL: ICD-10-CM

## 2024-09-13 DIAGNOSIS — G89.29 CHRONIC LEFT-SIDED LOW BACK PAIN WITH LEFT-SIDED SCIATICA: Primary | ICD-10-CM

## 2024-09-13 DIAGNOSIS — R73.09 ELEVATED HEMOGLOBIN A1C: ICD-10-CM

## 2024-09-13 DIAGNOSIS — M54.42 CHRONIC LEFT-SIDED LOW BACK PAIN WITH LEFT-SIDED SCIATICA: Primary | ICD-10-CM

## 2024-09-13 LAB
ALBUMIN SERPL BCP-MCNC: 4.2 G/DL (ref 3.5–5.2)
ALP SERPL-CCNC: 76 U/L (ref 55–135)
ALT SERPL W/O P-5'-P-CCNC: 23 U/L (ref 10–44)
ANION GAP SERPL CALC-SCNC: 8 MMOL/L (ref 8–16)
AST SERPL-CCNC: 21 U/L (ref 10–40)
BASOPHILS # BLD AUTO: 0.01 K/UL (ref 0–0.2)
BASOPHILS NFR BLD: 0.2 % (ref 0–1.9)
BILIRUB SERPL-MCNC: 0.3 MG/DL (ref 0.1–1)
BUN SERPL-MCNC: 13 MG/DL (ref 8–23)
CALCIUM SERPL-MCNC: 9.8 MG/DL (ref 8.7–10.5)
CHLORIDE SERPL-SCNC: 108 MMOL/L (ref 95–110)
CO2 SERPL-SCNC: 24 MMOL/L (ref 23–29)
CREAT SERPL-MCNC: 0.9 MG/DL (ref 0.5–1.4)
DIFFERENTIAL METHOD BLD: ABNORMAL
EOSINOPHIL # BLD AUTO: 0.1 K/UL (ref 0–0.5)
EOSINOPHIL NFR BLD: 2.8 % (ref 0–8)
ERYTHROCYTE [DISTWIDTH] IN BLOOD BY AUTOMATED COUNT: 14.7 % (ref 11.5–14.5)
EST. GFR  (NO RACE VARIABLE): >60 ML/MIN/1.73 M^2
GLUCOSE SERPL-MCNC: 99 MG/DL (ref 70–110)
HCT VFR BLD AUTO: 38.5 % (ref 37–48.5)
HGB BLD-MCNC: 11.9 G/DL (ref 12–16)
IMM GRANULOCYTES # BLD AUTO: 0.01 K/UL (ref 0–0.04)
IMM GRANULOCYTES NFR BLD AUTO: 0.2 % (ref 0–0.5)
LYMPHOCYTES # BLD AUTO: 1.6 K/UL (ref 1–4.8)
LYMPHOCYTES NFR BLD: 32.9 % (ref 18–48)
MCH RBC QN AUTO: 23.8 PG (ref 27–31)
MCHC RBC AUTO-ENTMCNC: 30.9 G/DL (ref 32–36)
MCV RBC AUTO: 77 FL (ref 82–98)
MONOCYTES # BLD AUTO: 0.5 K/UL (ref 0.3–1)
MONOCYTES NFR BLD: 9.3 % (ref 4–15)
NEUTROPHILS # BLD AUTO: 2.7 K/UL (ref 1.8–7.7)
NEUTROPHILS NFR BLD: 54.6 % (ref 38–73)
NRBC BLD-RTO: 0 /100 WBC
PLATELET # BLD AUTO: 191 K/UL (ref 150–450)
PMV BLD AUTO: 11.6 FL (ref 9.2–12.9)
POTASSIUM SERPL-SCNC: 4.6 MMOL/L (ref 3.5–5.1)
PROT SERPL-MCNC: 7.4 G/DL (ref 6–8.4)
RBC # BLD AUTO: 4.99 M/UL (ref 4–5.4)
SODIUM SERPL-SCNC: 140 MMOL/L (ref 136–145)
WBC # BLD AUTO: 4.95 K/UL (ref 3.9–12.7)

## 2024-09-13 PROCEDURE — 36415 COLL VENOUS BLD VENIPUNCTURE: CPT

## 2024-09-13 PROCEDURE — 80053 COMPREHEN METABOLIC PANEL: CPT

## 2024-09-13 PROCEDURE — 80061 LIPID PANEL: CPT

## 2024-09-13 PROCEDURE — 85025 COMPLETE CBC W/AUTO DIFF WBC: CPT

## 2024-09-13 PROCEDURE — 83036 HEMOGLOBIN GLYCOSYLATED A1C: CPT

## 2024-09-13 PROCEDURE — 99999 PR PBB SHADOW E&M-EST. PATIENT-LVL IV: CPT | Mod: PBBFAC,,,

## 2024-09-13 RX ORDER — ATORVASTATIN CALCIUM 40 MG/1
40 TABLET, FILM COATED ORAL
COMMUNITY
Start: 2024-06-23

## 2024-09-13 RX ORDER — MELOXICAM 15 MG/1
15 TABLET ORAL
COMMUNITY
Start: 2024-09-09

## 2024-09-13 RX ORDER — METHOCARBAMOL 750 MG/1
750 TABLET, FILM COATED ORAL NIGHTLY
Qty: 14 TABLET | Refills: 0 | Status: SHIPPED | OUTPATIENT
Start: 2024-09-13 | End: 2024-09-27

## 2024-09-13 RX ORDER — NETARSUDIL AND LATANOPROST OPHTHALMIC SOLUTION, 0.02%/0.005% .2; .05 MG/ML; MG/ML
1 SOLUTION/ DROPS OPHTHALMIC; TOPICAL
COMMUNITY

## 2024-09-13 RX ORDER — DORZOLAMIDE HYDROCHLORIDE AND TIMOLOL MALEATE 20; 5 MG/ML; MG/ML
1 SOLUTION/ DROPS OPHTHALMIC 2 TIMES DAILY
COMMUNITY

## 2024-09-13 NOTE — PROGRESS NOTES
CHIEF COMPLAINT     Chief Complaint   Patient presents with    Hospital Follow Up       HPI     Mariama Mcdowell is a 74 y.o. female who presents for xxx today.    PCP is No, Primary Doctor, patient is new to me. Pt reports sciatic pain L side that hurts with standing, sitting, moving, all of the time. Spasms at night that cause stabbing pains. Present x 6 months. Has tried tylenol arthritis, solon pas, lidocaine, hydrocodone, gabapentin, tramadol, all without relief. Also has tried physical therapy without relief. Will increase dose of methocarbamol and check labs. If kidney function is good will recommend ibuprofen for short course.    Past Medical History:  Past Medical History:   Diagnosis Date    Allergy     Arthritis     Cataract     Glaucoma     Hyperlipidemia        Home Medications:  Prior to Admission medications    Medication Sig Start Date End Date Taking? Authorizing Provider   acetaminophen (TYLENOL) 650 MG TbSR Take 650 mg by mouth every 8 (eight) hours.   Yes Provider, Historical   ascorbic acid (VITAMIN C) 1000 MG tablet Take 1,000 mg by mouth once daily.   Yes Provider, Historical   atorvastatin (LIPITOR) 40 MG tablet Take 40 mg by mouth. 6/23/24  Yes Provider, Historical   calcium carbonate (OS-ANNETTE) 600 mg (1,500 mg) Tab Take 600 mg by mouth once daily.   Yes Provider, Historical   dorzolamide-timolol 2-0.5% (COSOPT) 2-0.5 % ophthalmic solution Place 1 drop into both eyes once daily.   Yes Provider, Historical   dorzolamide-timolol 2-0.5% (COSOPT) 22.3-6.8 mg/mL ophthalmic solution Apply 1 drop to eye 2 (two) times daily.   Yes Provider, Historical   fish oil-omega-3 fatty acids 300-1,000 mg capsule Take by mouth once daily.   Yes Provider, Historical   latanoprost 0.005 % ophthalmic solution Place 1 drop into both eyes.   Yes Provider, Historical   magnesium 200 mg Tab Take 400 mg by mouth once daily.   Yes Provider, Historical   meloxicam (MOBIC) 15 MG tablet Take 15 mg by mouth. 9/9/24  Yes  "Provider, Historical   MULTIVITS W-FE,OTHER MIN/LUT (CENTRUM SILVER ULTRA WOMEN'S ORAL) Take by mouth.   Yes Provider, Historical   ROCKLATAN 0.02-0.005 % Drop Apply 1 drop to eye.   Yes Provider, Historical   vitamin D 1000 units Tab Take 185 mg by mouth once daily.   Yes Provider, Historical   gabapentin (NEURONTIN) 100 MG capsule Take 1 capsule (100 mg total) by mouth every evening.  Patient not taking: Reported on 9/13/2024 5/23/23   Juan Mcdermott MD   atorvastatin (LIPITOR) 20 MG tablet Take 20 mg by mouth once daily.  9/13/24  Provider, Historical       Review of Systems:  Review of Systems   Constitutional: Negative.    Respiratory: Negative.     Cardiovascular: Negative.    Musculoskeletal:  Positive for back pain.       Health Maintainence:   Immunizations:  Health Maintenance         Date Due Completion Date    Hepatitis C Screening Never done ---    RSV Vaccine (Age 60+ and Pregnant patients) (1 - 1-dose 60+ series) Never done ---    DEXA Scan 10/15/2023 10/15/2020    Influenza Vaccine (1) 09/01/2024 10/4/2023    COVID-19 Vaccine (6 - 2023-24 season) 09/01/2024 10/4/2023    Lipid Panel 05/04/2026 5/4/2021    TETANUS VACCINE 04/03/2029 4/3/2019             PHYSICAL EXAM     BP 98/60   Pulse 68   Ht 5' 8" (1.727 m)   Wt 85.1 kg (187 lb 9.8 oz)   SpO2 98%   BMI 28.53 kg/m²     Physical Exam  Vitals reviewed.   Constitutional:       Appearance: She is well-developed.   HENT:      Head: Normocephalic and atraumatic.   Eyes:      Conjunctiva/sclera: Conjunctivae normal.   Cardiovascular:      Rate and Rhythm: Normal rate.   Pulmonary:      Effort: Pulmonary effort is normal. No respiratory distress.   Skin:     General: Skin is warm and dry.      Findings: No rash.   Neurological:      Mental Status: She is alert and oriented to person, place, and time.      Coordination: Coordination normal.   Psychiatric:         Behavior: Behavior normal.         LABS     Lab Results   Component Value Date    HGBA1C 6.0 " "(H) 09/22/2020     CMP  No results found for: "NA", "K", "CL", "CO2", "GLU", "BUN", "CREATININE", "CALCIUM", "PROT", "ALBUMIN", "BILITOT", "ALKPHOS", "AST", "ALT", "ANIONGAP", "ESTGFRAFRICA", "EGFRNONAA"  No results found for: "WBC", "HGB", "HCT", "MCV", "PLT"  No results found for: "CHOL"  No results found for: "HDL"  No results found for: "LDLCALC"  No results found for: "TRIG"  No results found for: "CHOLHDL"  No results found for: "TSH", "S2CPMXD", "F9GXTLY", "THYROIDAB"    ASSESSMENT/PLAN   1. Chronic left-sided low back pain with left-sided sciatica  -     Ambulatory referral/consult to Orthopedics; Future; Expected date: 09/20/2024  -     methocarbamoL (ROBAXIN) 750 MG Tab; Take 1 tablet (750 mg total) by mouth every evening. for 14 days  Dispense: 14 tablet; Refill: 0    2. Elevated LDL cholesterol level  -     Lipid Panel; Future; Expected date: 09/13/2024    3. Blood glucose elevated  -     Hemoglobin A1C; Future; Expected date: 09/13/2024    4. Elevated hemoglobin A1c  -     CBC Auto Differential; Future; Expected date: 09/13/2024  -     Comprehensive Metabolic Panel; Future; Expected date: 09/13/2024  -     Hemoglobin A1C; Future; Expected date: 09/13/2024             Deuce Perdomo NP   Department of Internal Medicine [facility]  10:29 AM  "

## 2024-09-14 LAB
CHOLEST SERPL-MCNC: 217 MG/DL (ref 120–199)
CHOLEST/HDLC SERPL: 2.6 {RATIO} (ref 2–5)
ESTIMATED AVG GLUCOSE: 126 MG/DL (ref 68–131)
HBA1C MFR BLD: 6 % (ref 4–5.6)
HDLC SERPL-MCNC: 82 MG/DL (ref 40–75)
HDLC SERPL: 37.8 % (ref 20–50)
LDLC SERPL CALC-MCNC: 121.6 MG/DL (ref 63–159)
NONHDLC SERPL-MCNC: 135 MG/DL
TRIGL SERPL-MCNC: 67 MG/DL (ref 30–150)

## 2024-09-24 NOTE — PROGRESS NOTES
DATE: 9/24/2024  PATIENT: Mariama Mcdowell    Supervising Physician: Bari Pickett M.D.    CHIEF COMPLAINT: low back and bilateral leg pain    HISTORY:  Mariama Mcdowell is a 74 y.o. female here for initial evaluation of low back and bilateral leg pain (Back - 7, Leg - 3).  The pain in the back of both legs is what bothers her most.  The pain has been present for years, worsening in the past few months. The patient describes the pain as aching and radiating.  The pain is worse with activity and improved by nothing. There is positive associated numbness and tingling. There is positive subjective weakness. Prior treatments have included PT and gabapentin, but no ESIs or surgery.    The patient denies myelopathic symptoms such as handwriting changes or difficulty with buttons/coins/keys. Denies perineal paresthesias, bowel/bladder dysfunction.    PAST MEDICAL/SURGICAL HISTORY:  Past Medical History:   Diagnosis Date    Allergy     Arthritis     Cataract     Glaucoma     Hyperlipidemia      Past Surgical History:   Procedure Laterality Date    BUNIONECTOMY      left big toe    HYSTERECTOMY         Medications:   Current Outpatient Medications on File Prior to Visit   Medication Sig Dispense Refill    acetaminophen (TYLENOL) 650 MG TbSR Take 650 mg by mouth every 8 (eight) hours.      ascorbic acid (VITAMIN C) 1000 MG tablet Take 1,000 mg by mouth once daily.      atorvastatin (LIPITOR) 40 MG tablet Take 40 mg by mouth.      calcium carbonate (OS-ANNETTE) 600 mg (1,500 mg) Tab Take 600 mg by mouth once daily.      dorzolamide-timolol 2-0.5% (COSOPT) 2-0.5 % ophthalmic solution Place 1 drop into both eyes once daily.      dorzolamide-timolol 2-0.5% (COSOPT) 22.3-6.8 mg/mL ophthalmic solution Apply 1 drop to eye 2 (two) times daily.      fish oil-omega-3 fatty acids 300-1,000 mg capsule Take by mouth once daily.      gabapentin (NEURONTIN) 100 MG capsule Take 1 capsule (100 mg total) by mouth every evening. (Patient not taking: Reported  on 9/13/2024) 30 capsule 2    latanoprost 0.005 % ophthalmic solution Place 1 drop into both eyes.      magnesium 200 mg Tab Take 400 mg by mouth once daily.      meloxicam (MOBIC) 15 MG tablet Take 15 mg by mouth.      methocarbamoL (ROBAXIN) 750 MG Tab Take 1 tablet (750 mg total) by mouth every evening. for 14 days 14 tablet 0    MULTIVITS W-FE,OTHER MIN/LUT (CENTRUM SILVER ULTRA WOMEN'S ORAL) Take by mouth.      ROCKLATAN 0.02-0.005 % Drop Apply 1 drop to eye.      vitamin D 1000 units Tab Take 185 mg by mouth once daily.       No current facility-administered medications on file prior to visit.       Social History:   Social History     Socioeconomic History    Marital status: Single   Tobacco Use    Smoking status: Never    Smokeless tobacco: Never   Substance and Sexual Activity    Alcohol use: No     Social Determinants of Health     Financial Resource Strain: Low Risk  (7/29/2024)    Received from [facility]    Overall Financial Resource Strain (CARDIA)     Difficulty of Paying Living Expenses: Not hard at all   Food Insecurity: No Food Insecurity (7/29/2024)    Received from [facility]    Hunger Vital Sign     Worried About Running Out of Food in the Last Year: Never true     Ran Out of Food in the Last Year: Never true   Transportation Needs: No Transportation Needs (7/29/2024)    Received from [facility]    PRAPARE - Transportation     Lack of Transportation (Medical): No     Lack of Transportation (Non-Medical): No   Physical Activity: Unknown (7/26/2023)    [facility] from [facility], [facility]    Exercise Vital Sign     Days of Exercise per Week: 0 days   Stress: No Stress Concern Present (7/29/2024)    Received from [facility]    [employer] [place] - Occupational Stress Questionnaire     Feeling of Stress : Not at all   Housing Stability: Low Risk  (7/26/2023)    Received from [facility]    Housing Stability Vital Sign     Unable to Pay for Housing in the Last Year: No      Number of Places Lived in the Last Year: 1     In the last 12 months, was there a time when you did not have a steady place to sleep or slept in a shelter (including now)?: No       REVIEW OF SYSTEMS:  Constitution: Negative. Negative for chills, fever and night sweats.   Cardiovascular: Negative for chest pain and syncope.   Respiratory: Negative for cough and shortness of breath.   Gastrointestinal: See HPI. Negative for nausea/vomiting. Negative for abdominal pain.  Genitourinary: See HPI. Negative for discoloration or dysuria.  Skin: Negative for dry skin, itching and rash.   Hematologic/Lymphatic: Negative for bleeding problem. Does not bruise/bleed easily.   Musculoskeletal: Negative for falls and muscle weakness.   Neurological: See HPI. No seizures.   Endocrine: Negative for polydipsia, polyphagia and polyuria.   Allergic/Immunologic: Negative for hives and persistent infections.     EXAM:  There were no vitals taken for this visit.    General: The patient is a very pleasant 74 y.o. female in no apparent distress, the patient is oriented to person, place and time.  Psych: Normal mood and affect  HEENT: Vision grossly intact, hearing intact to the spoken word.  Lungs: Respirations unlabored.  Gait: Antalgic station gait  Skin: Dorsal lumbar skin negative for rashes, lesions, hairy patches and surgical scars. There is negative lumbar tenderness to palpation.  Range of motion: Lumbar range of motion is acceptable.  Spinal Balance: Global saggital and coronal spinal balance acceptable, not significant for scoliosis and kyphosis.  Musculoskeletal: No pain with the range of motion of the bilateral hips. No trochanteric tenderness to palpation.  Vascular: Bilateral lower extremities warm and well perfused, dorsalis pedis pulses 2+ bilaterally.  Neurological: Normal strength and tone in all major motor groups in the bilateral lower extremities. Normal sensation to light touch in the L2-S1 dermatomes  bilaterally.  Deep tendon reflexes symmetric 2+ in the bilateral lower extremities.  Negative Babinski bilaterally. Straight leg raise negative bilaterally.    IMAGING:      Today I personally reviewed MRI lumbar (2023 outside report) that details grade I anterolisthesis of L4 on L5 resulting in severe central stenosis and neural foraminal narrowing. Grade I anterolisthesis of L5 on S1 resulting in moderate stenosis and neural foraminal narrowing.      There is no height or weight on file to calculate BMI.    Hemoglobin A1C   Date Value Ref Range Status   09/13/2024 6.0 (H) 4.0 - 5.6 % Final     Comment:     ADA Screening Guidelines:  5.7-6.4%  Consistent with prediabetes  >or=6.5%  Consistent with diabetes    High levels of fetal hemoglobin interfere with the HbA1C  assay. Heterozygous hemoglobin variants (HbS, HgC, etc)do  not significantly interfere with this assay.   However, presence of multiple variants may affect accuracy.       Hemoglobin A1c   Date Value Ref Range Status   09/22/2020 6.0 (H) <5.7 % of total Hgb Final     Comment:     For someone without known diabetes, a hemoglobin   A1c value between 5.7% and 6.4% is consistent with  prediabetes and should be confirmed with a   follow-up test.    For someone with known diabetes, a value <7%  indicates that their diabetes is well controlled. A1c  targets should be individualized based on duration of  diabetes, age, comorbid conditions, and other  considerations.    This assay result is consistent with an increased risk  of diabetes.    Currently, no consensus exists regarding use of  hemoglobin A1c for diagnosis of diabetes for children.           ASSESSMENT/PLAN:    There are no diagnoses linked to this encounter.    Today we discussed at length all of the different treatment options including anti-inflammatories, acetaminophen, rest, ice, heat, physical therapy including strengthening and stretching exercises, home exercises, ROM, aerobic conditioning,  aqua therapy, other modalities including ultrasound, massage, and dry needling, epidural steroid injections and finally surgical intervention.      Pt presents with chronic low back pain and radiculopathy. Failure of conservative rx. Offered MARIANA, pt would like to think about it and will let me know. Would also possibly be a candidate for TLIF. Will send gabapentin 300mg qhs to pharmacy

## 2024-10-03 DIAGNOSIS — M51.369 DEGENERATION OF INTERVERTEBRAL DISC OF LUMBAR REGION, UNSPECIFIED WHETHER PAIN PRESENT: Primary | ICD-10-CM

## 2024-10-08 ENCOUNTER — OFFICE VISIT (OUTPATIENT)
Dept: ORTHOPEDICS | Facility: CLINIC | Age: 74
End: 2024-10-08
Payer: MEDICARE

## 2024-10-08 VITALS — HEIGHT: 68 IN | BODY MASS INDEX: 28.44 KG/M2 | WEIGHT: 187.63 LBS

## 2024-10-08 DIAGNOSIS — G89.29 CHRONIC LEFT-SIDED LOW BACK PAIN WITH LEFT-SIDED SCIATICA: ICD-10-CM

## 2024-10-08 DIAGNOSIS — M54.42 CHRONIC LEFT-SIDED LOW BACK PAIN WITH LEFT-SIDED SCIATICA: ICD-10-CM

## 2024-10-08 PROCEDURE — 99999 PR PBB SHADOW E&M-EST. PATIENT-LVL III: CPT | Mod: PBBFAC,,, | Performed by: ORTHOPAEDIC SURGERY

## 2024-10-08 PROCEDURE — 3288F FALL RISK ASSESSMENT DOCD: CPT | Mod: CPTII,S$GLB,, | Performed by: ORTHOPAEDIC SURGERY

## 2024-10-08 PROCEDURE — 1125F AMNT PAIN NOTED PAIN PRSNT: CPT | Mod: CPTII,S$GLB,, | Performed by: ORTHOPAEDIC SURGERY

## 2024-10-08 PROCEDURE — 1101F PT FALLS ASSESS-DOCD LE1/YR: CPT | Mod: CPTII,S$GLB,, | Performed by: ORTHOPAEDIC SURGERY

## 2024-10-08 PROCEDURE — 1159F MED LIST DOCD IN RCRD: CPT | Mod: CPTII,S$GLB,, | Performed by: ORTHOPAEDIC SURGERY

## 2024-10-08 PROCEDURE — 99204 OFFICE O/P NEW MOD 45 MIN: CPT | Mod: S$GLB,,, | Performed by: ORTHOPAEDIC SURGERY

## 2024-10-08 RX ORDER — GABAPENTIN 300 MG/1
300 CAPSULE ORAL NIGHTLY
Qty: 30 CAPSULE | Refills: 11 | Status: SHIPPED | OUTPATIENT
Start: 2024-10-08 | End: 2025-10-08

## 2024-10-08 RX ORDER — HYDROCODONE BITARTRATE AND ACETAMINOPHEN 10; 325 MG/1; MG/1
1 TABLET ORAL
COMMUNITY

## 2024-10-10 ENCOUNTER — TELEPHONE (OUTPATIENT)
Dept: PAIN MEDICINE | Facility: CLINIC | Age: 74
End: 2024-10-10
Payer: MEDICARE

## 2024-10-10 NOTE — TELEPHONE ENCOUNTER
----- Message from Lauren Milan MD sent at 10/10/2024  1:09 PM CDT -----  Regarding: FW: Appointment  Please see below.  ----- Message -----  From: Gianfranco Butler  Sent: 10/10/2024  12:35 PM CDT  To: Lauren Milan MD  Subject: Appointment                                      Name of Who is Calling:  Patient          What is the request in detail:  Please contact patient she had an appointment that was cancelled and need a new appointmet            Can the clinic reply by [facility]: No            What Number to Call Back if not in [facility]: [phone]